# Patient Record
Sex: FEMALE | Race: WHITE | NOT HISPANIC OR LATINO | Employment: UNEMPLOYED | URBAN - METROPOLITAN AREA
[De-identification: names, ages, dates, MRNs, and addresses within clinical notes are randomized per-mention and may not be internally consistent; named-entity substitution may affect disease eponyms.]

---

## 2023-03-06 DIAGNOSIS — F32.A DEPRESSION, UNSPECIFIED DEPRESSION TYPE: ICD-10-CM

## 2023-03-06 DIAGNOSIS — I10 PRIMARY HYPERTENSION: ICD-10-CM

## 2023-03-06 DIAGNOSIS — F51.01 PRIMARY INSOMNIA: ICD-10-CM

## 2023-03-06 DIAGNOSIS — K21.9 GASTROESOPHAGEAL REFLUX DISEASE WITHOUT ESOPHAGITIS: Primary | ICD-10-CM

## 2023-03-06 DIAGNOSIS — R32 INCONTINENCE IN FEMALE: ICD-10-CM

## 2023-03-06 RX ORDER — SERTRALINE HYDROCHLORIDE 100 MG/1
100 TABLET, FILM COATED ORAL 2 TIMES DAILY
Qty: 180 TABLET | Refills: 1 | Status: SHIPPED | OUTPATIENT
Start: 2023-03-06 | End: 2023-08-21 | Stop reason: SDUPTHER

## 2023-03-06 RX ORDER — QUETIAPINE FUMARATE 50 MG/1
50 TABLET, FILM COATED ORAL NIGHTLY
Qty: 90 TABLET | Refills: 1 | Status: SHIPPED | OUTPATIENT
Start: 2023-03-06 | End: 2023-08-21 | Stop reason: SDUPTHER

## 2023-03-06 RX ORDER — BUPROPION HYDROCHLORIDE 150 MG/1
150 TABLET, EXTENDED RELEASE ORAL 2 TIMES DAILY
Qty: 180 TABLET | Refills: 1 | Status: SHIPPED | OUTPATIENT
Start: 2023-03-06 | End: 2023-08-21 | Stop reason: SDUPTHER

## 2023-03-06 RX ORDER — OXYBUTYNIN CHLORIDE 15 MG/1
15 TABLET, EXTENDED RELEASE ORAL DAILY
COMMUNITY
Start: 2022-09-01 | End: 2023-03-06 | Stop reason: SDUPTHER

## 2023-03-06 RX ORDER — FOLIC ACID 1 MG/1
1 TABLET ORAL DAILY
COMMUNITY
Start: 2022-01-17

## 2023-03-06 RX ORDER — AMLODIPINE AND BENAZEPRIL HYDROCHLORIDE 5; 10 MG/1; MG/1
1 CAPSULE ORAL DAILY
Qty: 90 CAPSULE | Refills: 1 | Status: SHIPPED | OUTPATIENT
Start: 2023-03-06 | End: 2023-08-21 | Stop reason: SDUPTHER

## 2023-03-06 RX ORDER — TIZANIDINE 4 MG/1
4 TABLET ORAL 2 TIMES DAILY PRN
COMMUNITY
Start: 2022-04-27

## 2023-03-06 RX ORDER — LANSOPRAZOLE 30 MG/1
30 CAPSULE, DELAYED RELEASE ORAL DAILY
COMMUNITY
Start: 2020-08-27 | End: 2023-03-06 | Stop reason: SDUPTHER

## 2023-03-06 RX ORDER — AMITRIPTYLINE HYDROCHLORIDE 50 MG/1
50 TABLET, FILM COATED ORAL NIGHTLY
COMMUNITY
Start: 2021-03-17 | End: 2023-03-06 | Stop reason: SDUPTHER

## 2023-03-06 RX ORDER — LANSOPRAZOLE 30 MG/1
30 CAPSULE, DELAYED RELEASE ORAL DAILY
Qty: 90 CAPSULE | Refills: 1 | Status: SHIPPED | OUTPATIENT
Start: 2023-03-06 | End: 2023-08-21 | Stop reason: SDUPTHER

## 2023-03-06 RX ORDER — OXYBUTYNIN CHLORIDE 15 MG/1
15 TABLET, EXTENDED RELEASE ORAL DAILY
Qty: 90 TABLET | Refills: 1 | Status: SHIPPED | OUTPATIENT
Start: 2023-03-06 | End: 2023-08-21 | Stop reason: SDUPTHER

## 2023-03-06 RX ORDER — AMLODIPINE AND BENAZEPRIL HYDROCHLORIDE 5; 10 MG/1; MG/1
1 CAPSULE ORAL DAILY
COMMUNITY
Start: 2022-04-27 | End: 2023-03-06 | Stop reason: SDUPTHER

## 2023-03-06 RX ORDER — SERTRALINE HYDROCHLORIDE 100 MG/1
100 TABLET, FILM COATED ORAL 2 TIMES DAILY
COMMUNITY
Start: 2021-03-29 | End: 2023-03-06 | Stop reason: SDUPTHER

## 2023-03-06 RX ORDER — METHOTREXATE 2.5 MG/1
2.5 TABLET ORAL
COMMUNITY
Start: 2022-01-17

## 2023-03-06 RX ORDER — AMITRIPTYLINE HYDROCHLORIDE 50 MG/1
50 TABLET, FILM COATED ORAL NIGHTLY
Qty: 90 TABLET | Refills: 1 | Status: SHIPPED | OUTPATIENT
Start: 2023-03-06 | End: 2023-08-21 | Stop reason: SDUPTHER

## 2023-03-06 RX ORDER — CARVEDILOL 12.5 MG/1
12.5 TABLET ORAL 2 TIMES DAILY
COMMUNITY
Start: 2022-04-27 | End: 2023-03-06 | Stop reason: SDUPTHER

## 2023-03-06 RX ORDER — GABAPENTIN 300 MG/1
300 CAPSULE ORAL 3 TIMES DAILY PRN
COMMUNITY
Start: 2021-04-28 | End: 2023-08-21 | Stop reason: SDUPTHER

## 2023-03-06 RX ORDER — QUETIAPINE FUMARATE 50 MG/1
50 TABLET, FILM COATED ORAL NIGHTLY
COMMUNITY
Start: 2022-04-27 | End: 2023-03-06 | Stop reason: SDUPTHER

## 2023-03-06 RX ORDER — BUPROPION HYDROCHLORIDE 150 MG/1
150 TABLET, EXTENDED RELEASE ORAL 2 TIMES DAILY
COMMUNITY
Start: 2021-04-02 | End: 2023-03-06 | Stop reason: SDUPTHER

## 2023-03-06 RX ORDER — CARVEDILOL 12.5 MG/1
12.5 TABLET ORAL 2 TIMES DAILY
Qty: 90 TABLET | Refills: 1 | Status: SHIPPED | OUTPATIENT
Start: 2023-03-06 | End: 2023-04-12 | Stop reason: SDUPTHER

## 2023-04-12 DIAGNOSIS — I10 PRIMARY HYPERTENSION: ICD-10-CM

## 2023-04-12 RX ORDER — CARVEDILOL 12.5 MG/1
12.5 TABLET ORAL 2 TIMES DAILY
Qty: 180 TABLET | Refills: 1 | Status: SHIPPED | OUTPATIENT
Start: 2023-04-12 | End: 2023-08-21 | Stop reason: SDUPTHER

## 2023-07-18 ENCOUNTER — APPOINTMENT (OUTPATIENT)
Dept: PRIMARY CARE | Facility: CLINIC | Age: 73
End: 2023-07-18
Payer: MEDICARE

## 2023-07-18 PROBLEM — I25.10 CAD (CORONARY ARTERY DISEASE): Status: ACTIVE | Noted: 2023-07-18

## 2023-07-18 PROBLEM — R32 INCONTINENCE OF URINE: Status: ACTIVE | Noted: 2023-07-18

## 2023-07-18 PROBLEM — S88.119A AMPUTATION BELOW KNEE (MULTI): Status: ACTIVE | Noted: 2023-07-18

## 2023-07-18 PROBLEM — E05.90 HYPERTHYROIDISM: Status: ACTIVE | Noted: 2023-07-18

## 2023-07-18 PROBLEM — S78.119A AMPUTATION ABOVE KNEE (MULTI): Status: ACTIVE | Noted: 2023-07-18

## 2023-07-18 PROBLEM — I50.22 CHRONIC SYSTOLIC CONGESTIVE HEART FAILURE (MULTI): Status: RESOLVED | Noted: 2023-07-18 | Resolved: 2023-07-18

## 2023-07-18 PROBLEM — F31.9 BIPOLAR DEPRESSION (MULTI): Status: ACTIVE | Noted: 2023-07-18

## 2023-07-18 PROBLEM — G89.29 CHRONIC PAIN: Status: ACTIVE | Noted: 2023-07-18

## 2023-07-18 PROBLEM — K21.9 GERD (GASTROESOPHAGEAL REFLUX DISEASE): Status: ACTIVE | Noted: 2023-07-18

## 2023-07-18 PROBLEM — R52 PHANTOM PAIN: Status: ACTIVE | Noted: 2023-07-18

## 2023-07-18 PROBLEM — G54.8 PHANTOM PAIN: Status: ACTIVE | Noted: 2023-07-18

## 2023-07-18 PROBLEM — I10 BENIGN ESSENTIAL HYPERTENSION: Status: ACTIVE | Noted: 2023-07-18

## 2023-08-11 ENCOUNTER — APPOINTMENT (OUTPATIENT)
Dept: PRIMARY CARE | Facility: CLINIC | Age: 73
End: 2023-08-11
Payer: MEDICARE

## 2023-08-21 ENCOUNTER — OFFICE VISIT (OUTPATIENT)
Dept: PRIMARY CARE | Facility: CLINIC | Age: 73
End: 2023-08-21
Payer: MEDICARE

## 2023-08-21 VITALS
OXYGEN SATURATION: 96 % | RESPIRATION RATE: 16 BRPM | HEART RATE: 86 BPM | DIASTOLIC BLOOD PRESSURE: 60 MMHG | TEMPERATURE: 97 F | SYSTOLIC BLOOD PRESSURE: 112 MMHG

## 2023-08-21 DIAGNOSIS — R52 PHANTOM PAIN: ICD-10-CM

## 2023-08-21 DIAGNOSIS — K21.9 GASTROESOPHAGEAL REFLUX DISEASE WITHOUT ESOPHAGITIS: ICD-10-CM

## 2023-08-21 DIAGNOSIS — S78.119A AMPUTATION ABOVE KNEE (MULTI): ICD-10-CM

## 2023-08-21 DIAGNOSIS — S88.119A AMPUTATION BELOW KNEE (MULTI): ICD-10-CM

## 2023-08-21 DIAGNOSIS — F51.01 PRIMARY INSOMNIA: ICD-10-CM

## 2023-08-21 DIAGNOSIS — K51.90 ULCERATIVE COLITIS WITHOUT COMPLICATIONS, UNSPECIFIED LOCATION (MULTI): ICD-10-CM

## 2023-08-21 DIAGNOSIS — I10 BENIGN ESSENTIAL HYPERTENSION: Primary | ICD-10-CM

## 2023-08-21 DIAGNOSIS — M05.50 RHEUMATOID POLYNEUROPATHY WITH RHEUMATOID ARTHRITIS OF UNSPECIFIED SITE (MULTI): ICD-10-CM

## 2023-08-21 DIAGNOSIS — N39.46 MIXED STRESS AND URGE URINARY INCONTINENCE: ICD-10-CM

## 2023-08-21 DIAGNOSIS — F31.9 BIPOLAR DEPRESSION (MULTI): ICD-10-CM

## 2023-08-21 DIAGNOSIS — I73.9 PERIPHERAL VASCULAR DISEASE, UNSPECIFIED (CMS-HCC): ICD-10-CM

## 2023-08-21 DIAGNOSIS — G43.909 MIGRAINE SYNDROME: ICD-10-CM

## 2023-08-21 DIAGNOSIS — G54.8 PHANTOM PAIN: ICD-10-CM

## 2023-08-21 PROCEDURE — 1159F MED LIST DOCD IN RCRD: CPT | Performed by: FAMILY MEDICINE

## 2023-08-21 PROCEDURE — 90677 PCV20 VACCINE IM: CPT | Performed by: FAMILY MEDICINE

## 2023-08-21 PROCEDURE — G0009 ADMIN PNEUMOCOCCAL VACCINE: HCPCS | Performed by: FAMILY MEDICINE

## 2023-08-21 PROCEDURE — 99215 OFFICE O/P EST HI 40 MIN: CPT | Performed by: FAMILY MEDICINE

## 2023-08-21 PROCEDURE — 3074F SYST BP LT 130 MM HG: CPT | Performed by: FAMILY MEDICINE

## 2023-08-21 PROCEDURE — 3078F DIAST BP <80 MM HG: CPT | Performed by: FAMILY MEDICINE

## 2023-08-21 PROCEDURE — 1160F RVW MEDS BY RX/DR IN RCRD: CPT | Performed by: FAMILY MEDICINE

## 2023-08-21 RX ORDER — AMITRIPTYLINE HYDROCHLORIDE 50 MG/1
50 TABLET, FILM COATED ORAL NIGHTLY
Qty: 90 TABLET | Refills: 1 | Status: SHIPPED | OUTPATIENT
Start: 2023-08-21 | End: 2024-03-07

## 2023-08-21 RX ORDER — QUETIAPINE FUMARATE 50 MG/1
50 TABLET, FILM COATED ORAL NIGHTLY
Qty: 90 TABLET | Refills: 1 | Status: SHIPPED | OUTPATIENT
Start: 2023-08-21 | End: 2024-03-07

## 2023-08-21 RX ORDER — CARVEDILOL 12.5 MG/1
12.5 TABLET ORAL 2 TIMES DAILY
Qty: 180 TABLET | Refills: 1 | Status: SHIPPED | OUTPATIENT
Start: 2023-08-21 | End: 2024-03-07

## 2023-08-21 RX ORDER — LANSOPRAZOLE 30 MG/1
30 CAPSULE, DELAYED RELEASE ORAL DAILY
Qty: 90 CAPSULE | Refills: 1 | Status: SHIPPED | OUTPATIENT
Start: 2023-08-21 | End: 2024-03-07

## 2023-08-21 RX ORDER — GABAPENTIN 300 MG/1
300 CAPSULE ORAL 3 TIMES DAILY
Qty: 270 CAPSULE | Refills: 1 | Status: SHIPPED | OUTPATIENT
Start: 2023-08-21 | End: 2024-03-07

## 2023-08-21 RX ORDER — SERTRALINE HYDROCHLORIDE 100 MG/1
100 TABLET, FILM COATED ORAL 2 TIMES DAILY
Qty: 180 TABLET | Refills: 1 | Status: SHIPPED | OUTPATIENT
Start: 2023-08-21 | End: 2024-03-07

## 2023-08-21 RX ORDER — OXYBUTYNIN CHLORIDE 15 MG/1
15 TABLET, EXTENDED RELEASE ORAL DAILY
Qty: 90 TABLET | Refills: 1 | Status: SHIPPED | OUTPATIENT
Start: 2023-08-21 | End: 2024-03-07

## 2023-08-21 RX ORDER — BUPROPION HYDROCHLORIDE 150 MG/1
150 TABLET, EXTENDED RELEASE ORAL 2 TIMES DAILY
Qty: 180 TABLET | Refills: 1 | Status: SHIPPED | OUTPATIENT
Start: 2023-08-21 | End: 2024-06-04 | Stop reason: SDUPTHER

## 2023-08-21 RX ORDER — AMLODIPINE AND BENAZEPRIL HYDROCHLORIDE 5; 10 MG/1; MG/1
1 CAPSULE ORAL DAILY
Qty: 90 CAPSULE | Refills: 1 | Status: SHIPPED | OUTPATIENT
Start: 2023-08-21 | End: 2024-03-07

## 2023-08-21 NOTE — PROGRESS NOTES
"Sharon Rome is a 72 y.o. female here today for   Chief Complaint   Patient presents with    Depression    Hypertension           She was \"sick\" from February until a few weeks ago - nausea.  Was having heartburn frequently.  But no abdominal pain.  No vomiting.  She says now she is back to normal and eating normally.  No PMH of peptic ulcer disease.  She does take lansoprazole every day.  She denies any change in her bowel movements.  She does not drink ETOH.      HTN recheck -- Patient denies chest pain, SOB, edema, palpitations on review.  Taking medication correctly and denies any side effects.      Not seeing a psychiatrist.  Now she says her previous PCP was Rxing  her psych meds.      Amitriptyline is Rxed for migraines and insomnia.  .      Dr. Watts is rheumatologist.      At age 1 with rheumatic fever and nearly .      Coronary artery disease-she gives a history of 2 heart attacks remotely and a H/O 2 angioplasties.  She completely denies any current chest pain or heart palpitations.  She still smokes cigarettes and says she is not ready to quit yet.    Patient has a very difficult time with transportation since she is a double amputee.  I have referred her to cardiology and pain management at our last visit but she says that she did not make appointments because of transportation problems.  She asks if I can prescribe her medications for her psychiatric history of bipolar depression.  She says that her bipolar depression has been controlled very well with her current medications.  She also takes gabapentin for phantom pain and chronic joint pain.  She says that her pain is fairly well controlled and she would like to continue the same medications.  She also takes lansoprazole daily for reflux and she says this is well controlled.  She has a history of mixed incontinence and takes oxybutynin.  I changed it to extended release oxybutynin at a previous visit and she says this works much better with " much less side effects.      Current Outpatient Medications:     folic acid (Folvite) 1 mg tablet, Take 1 tablet (1 mg) by mouth once daily., Disp: , Rfl:     methotrexate (Trexall) 2.5 mg tablet, Take 1 tablet (2.5 mg total) by mouth.  6 tablets weekly, Disp: , Rfl:     tiZANidine (Zanaflex) 4 mg tablet, Take 1 tablet (4 mg) by mouth 2 times a day as needed for muscle spasms., Disp: , Rfl:     amitriptyline (Elavil) 50 mg tablet, Take 1 tablet (50 mg) by mouth once daily at bedtime., Disp: 90 tablet, Rfl: 1    amLODIPine-benazepriL (Lotrel) 5-10 mg capsule, Take 1 capsule by mouth once daily., Disp: 90 capsule, Rfl: 1    buPROPion SR (Wellbutrin SR) 150 mg 12 hr tablet, Take 1 tablet (150 mg) by mouth 2 times a day., Disp: 180 tablet, Rfl: 1    carvedilol (Coreg) 12.5 mg tablet, Take 1 tablet (12.5 mg) by mouth 2 times a day., Disp: 180 tablet, Rfl: 1    gabapentin (Neurontin) 300 mg capsule, Take 1 capsule (300 mg) by mouth 3 times a day., Disp: 270 capsule, Rfl: 1    lansoprazole (Prevacid) 30 mg DR capsule, Take 1 capsule (30 mg) by mouth once daily., Disp: 90 capsule, Rfl: 1    oxybutynin XL (Ditropan-XL) 15 mg 24 hr tablet, Take 1 tablet (15 mg) by mouth once daily., Disp: 90 tablet, Rfl: 1    QUEtiapine (SEROquel) 50 mg tablet, Take 1 tablet (50 mg) by mouth once daily at bedtime., Disp: 90 tablet, Rfl: 1    sertraline (Zoloft) 100 mg tablet, Take 1 tablet (100 mg) by mouth 2 times a day., Disp: 180 tablet, Rfl: 1    Patient Active Problem List   Diagnosis    Amputation above knee (CMS/HCC)    Amputation below knee (CMS/HCC)    Benign essential hypertension    Bipolar depression (CMS/HCC)    CAD (coronary artery disease)    Chronic pain    GERD (gastroesophageal reflux disease)    Hyperthyroidism    Incontinence of urine    Phantom pain (CMS/HCC)    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     Objective    Visit Vitals  /60   Pulse 86   Temp 36.1 °C (97 °F)   Resp 16   SpO2 96%     There is no height or weight on file to calculate BMI.     Physical Exam   General - Not in acute distress and cooperative.  Build & Nutrition - Well developed  Posture - Normal  Gait - Normal  Mental Status - alert and oriented x 3    Head - Normocephalic    Neck - Thyroid normal size    Eyes - Bilateral - Sclera clear and lids pink without edema or mass.      Skin - Warm and dry with no rashes on visible skin    Lungs - Clear to auscultation and normal breathing effort    Cardiovascular - RRR and no murmurs, rubs or thrill.    Neuropsychiatric - normal mood and affect        Assessment    1. Benign essential hypertension  amLODIPine-benazepriL (Lotrel) 5-10 mg capsule, carvedilol (Coreg) 12.5 mg tablet, Comprehensive Metabolic Panel, CBC, Lipid Panel, Thyroid Stimulating Hormone   Condition well controlled.  No change in current treatment regimen.  Refill given of current medication.  Appropriate labs ordered or reviewed.  Make a follow up appointment with me for recheck in 6 months.       2. Amputation above knee (CMS/HCC)     This has been stable with no recurrence of infection and she says her stump is healthy.   3. Amputation below knee (CMS/HCC)     This has been stable with no recurrence of infection and she says her stump is healthy.   4. Bipolar depression (CMS/HCC)  buPROPion SR (Wellbutrin SR) 150 mg 12 hr tablet, QUEtiapine (SEROquel) 50 mg tablet, sertraline (Zoloft) 100 mg tablet   I will take over prescribing her medications for bipolar depression since she has such difficulty with transportation.  We will continue the same medications and doses.   5. Phantom pain (CMS/HCC)  gabapentin (Neurontin) 300 mg capsule   I will take over prescribing her gabapentin for phantom pain and  other chronic pain secondary to arthritis.   6. Gastroesophageal reflux disease without esophagitis  lansoprazole (Prevacid) 30 mg DR capsule   She did have some recent GI symptoms with nausea as above.  They have completely resolved now and her abdominal exam is normal.  We will continue Prevacid and I told her if her symptoms recur she should get into see me as soon as possible.   7. Primary insomnia     She will continue amitriptyline.  This is also used for chronic migraine headaches.   8. Mixed stress and urge urinary incontinence  oxybutynin XL (Ditropan-XL) 15 mg 24 hr tablet   Condition well controlled.  No change in current treatment regimen.  Refill given of current medication.  Make a follow up appointment with me for recheck in 6 months.   9. 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  She is on gabapentin which does help with this.   10. Ulcerative colitis without complications, unspecified location (CMS/HCC)     She reports this has been stable for years with no blood in her stool or chronic diarrhea.   11. Migraine syndrome  amitriptyline (Elavil) 50 mg tablet   Condition well controlled.  No change in current treatment regimen.  Refill given of current medication.  Make a follow up appointment with me for recheck in 6 months.     12. Peripheral vascular disease, unspecified (CMS/HCC)     Patient with previous history of peripheral vascular disease but now she is a double amputee.  No evidence of arterial compromise in her legs stumps or arms.       She received Prevnar 20 today for routine immunization.

## 2023-08-22 PROBLEM — M05.50 RHEUMATOID POLYNEUROPATHY WITH RHEUMATOID ARTHRITIS OF UNSPECIFIED SITE (MULTI): Status: ACTIVE | Noted: 2023-08-22

## 2023-08-22 PROBLEM — F51.01 PRIMARY INSOMNIA: Status: ACTIVE | Noted: 2023-08-22

## 2023-08-22 PROBLEM — G43.909 MIGRAINE SYNDROME: Status: ACTIVE | Noted: 2023-08-22

## 2023-08-22 PROBLEM — K51.90 ULCERATIVE COLITIS WITHOUT COMPLICATIONS, UNSPECIFIED LOCATION (MULTI): Status: ACTIVE | Noted: 2023-08-22

## 2023-08-23 PROBLEM — I73.9 PERIPHERAL VASCULAR DISEASE, UNSPECIFIED (CMS-HCC): Status: ACTIVE | Noted: 2023-08-23

## 2023-08-30 ENCOUNTER — APPOINTMENT (OUTPATIENT)
Dept: PRIMARY CARE | Facility: CLINIC | Age: 73
End: 2023-08-30
Payer: MEDICARE

## 2023-09-01 DIAGNOSIS — G43.909 MIGRAINE SYNDROME: ICD-10-CM

## 2023-09-01 RX ORDER — AMITRIPTYLINE HYDROCHLORIDE 50 MG/1
50 TABLET, FILM COATED ORAL NIGHTLY
Qty: 90 TABLET | Refills: 0 | OUTPATIENT
Start: 2023-09-01

## 2024-03-07 DIAGNOSIS — K21.9 GASTROESOPHAGEAL REFLUX DISEASE WITHOUT ESOPHAGITIS: ICD-10-CM

## 2024-03-07 DIAGNOSIS — G54.8 PHANTOM PAIN: ICD-10-CM

## 2024-03-07 DIAGNOSIS — R52 PHANTOM PAIN: ICD-10-CM

## 2024-03-07 DIAGNOSIS — N39.46 MIXED STRESS AND URGE URINARY INCONTINENCE: ICD-10-CM

## 2024-03-07 DIAGNOSIS — G43.909 MIGRAINE SYNDROME: ICD-10-CM

## 2024-03-07 DIAGNOSIS — I10 BENIGN ESSENTIAL HYPERTENSION: ICD-10-CM

## 2024-03-07 DIAGNOSIS — F31.9 BIPOLAR DEPRESSION (MULTI): ICD-10-CM

## 2024-03-07 RX ORDER — AMITRIPTYLINE HYDROCHLORIDE 50 MG/1
50 TABLET, FILM COATED ORAL NIGHTLY
Qty: 90 TABLET | Refills: 0 | Status: SHIPPED | OUTPATIENT
Start: 2024-03-07 | End: 2024-06-04 | Stop reason: SDUPTHER

## 2024-03-07 RX ORDER — SERTRALINE HYDROCHLORIDE 100 MG/1
100 TABLET, FILM COATED ORAL 2 TIMES DAILY
Qty: 180 TABLET | Refills: 0 | Status: SHIPPED | OUTPATIENT
Start: 2024-03-07 | End: 2024-06-04 | Stop reason: SDUPTHER

## 2024-03-07 RX ORDER — LANSOPRAZOLE 30 MG/1
30 CAPSULE, DELAYED RELEASE ORAL DAILY
Qty: 90 CAPSULE | Refills: 0 | Status: SHIPPED | OUTPATIENT
Start: 2024-03-07 | End: 2024-06-04 | Stop reason: SDUPTHER

## 2024-03-07 RX ORDER — QUETIAPINE FUMARATE 50 MG/1
50 TABLET, FILM COATED ORAL NIGHTLY
Qty: 90 TABLET | Refills: 0 | Status: SHIPPED | OUTPATIENT
Start: 2024-03-07 | End: 2024-06-04 | Stop reason: SDUPTHER

## 2024-03-07 RX ORDER — GABAPENTIN 300 MG/1
300 CAPSULE ORAL 3 TIMES DAILY
Qty: 270 CAPSULE | Refills: 0 | Status: SHIPPED | OUTPATIENT
Start: 2024-03-07 | End: 2024-06-04 | Stop reason: SDUPTHER

## 2024-03-07 RX ORDER — CARVEDILOL 12.5 MG/1
12.5 TABLET ORAL 2 TIMES DAILY
Qty: 180 TABLET | Refills: 0 | Status: SHIPPED | OUTPATIENT
Start: 2024-03-07 | End: 2024-06-04 | Stop reason: SDUPTHER

## 2024-03-07 RX ORDER — AMLODIPINE AND BENAZEPRIL HYDROCHLORIDE 5; 10 MG/1; MG/1
1 CAPSULE ORAL DAILY
Qty: 90 CAPSULE | Refills: 0 | Status: SHIPPED | OUTPATIENT
Start: 2024-03-07 | End: 2024-06-04 | Stop reason: SDUPTHER

## 2024-03-07 RX ORDER — OXYBUTYNIN CHLORIDE 15 MG/1
15 TABLET, EXTENDED RELEASE ORAL DAILY
Qty: 90 TABLET | Refills: 0 | Status: SHIPPED | OUTPATIENT
Start: 2024-03-07 | End: 2024-06-04 | Stop reason: SDUPTHER

## 2024-06-04 ENCOUNTER — OFFICE VISIT (OUTPATIENT)
Dept: PRIMARY CARE | Facility: CLINIC | Age: 74
End: 2024-06-04
Payer: MEDICARE

## 2024-06-04 VITALS
OXYGEN SATURATION: 96 % | HEART RATE: 66 BPM | RESPIRATION RATE: 16 BRPM | SYSTOLIC BLOOD PRESSURE: 110 MMHG | DIASTOLIC BLOOD PRESSURE: 64 MMHG | TEMPERATURE: 98.2 F

## 2024-06-04 DIAGNOSIS — N39.46 MIXED STRESS AND URGE URINARY INCONTINENCE: ICD-10-CM

## 2024-06-04 DIAGNOSIS — R52 PHANTOM PAIN: ICD-10-CM

## 2024-06-04 DIAGNOSIS — R09.89 PHLEGM IN THROAT: Primary | ICD-10-CM

## 2024-06-04 DIAGNOSIS — R05.3 CHRONIC COUGH: ICD-10-CM

## 2024-06-04 DIAGNOSIS — I10 BENIGN ESSENTIAL HYPERTENSION: ICD-10-CM

## 2024-06-04 DIAGNOSIS — K21.9 GASTROESOPHAGEAL REFLUX DISEASE WITHOUT ESOPHAGITIS: ICD-10-CM

## 2024-06-04 DIAGNOSIS — G43.909 MIGRAINE SYNDROME: ICD-10-CM

## 2024-06-04 DIAGNOSIS — F31.9 BIPOLAR DEPRESSION (MULTI): ICD-10-CM

## 2024-06-04 DIAGNOSIS — G54.8 PHANTOM PAIN: ICD-10-CM

## 2024-06-04 PROCEDURE — 3078F DIAST BP <80 MM HG: CPT | Performed by: FAMILY MEDICINE

## 2024-06-04 PROCEDURE — 1160F RVW MEDS BY RX/DR IN RCRD: CPT | Performed by: FAMILY MEDICINE

## 2024-06-04 PROCEDURE — 99215 OFFICE O/P EST HI 40 MIN: CPT | Performed by: FAMILY MEDICINE

## 2024-06-04 PROCEDURE — 1159F MED LIST DOCD IN RCRD: CPT | Performed by: FAMILY MEDICINE

## 2024-06-04 PROCEDURE — G2211 COMPLEX E/M VISIT ADD ON: HCPCS | Performed by: FAMILY MEDICINE

## 2024-06-04 PROCEDURE — 3074F SYST BP LT 130 MM HG: CPT | Performed by: FAMILY MEDICINE

## 2024-06-04 RX ORDER — LANSOPRAZOLE 30 MG/1
30 CAPSULE, DELAYED RELEASE ORAL DAILY
Qty: 90 CAPSULE | Refills: 1 | Status: SHIPPED | OUTPATIENT
Start: 2024-06-04

## 2024-06-04 RX ORDER — SERTRALINE HYDROCHLORIDE 100 MG/1
200 TABLET, FILM COATED ORAL DAILY
Qty: 180 TABLET | Refills: 1 | Status: SHIPPED | OUTPATIENT
Start: 2024-06-04

## 2024-06-04 RX ORDER — AMLODIPINE AND BENAZEPRIL HYDROCHLORIDE 5; 10 MG/1; MG/1
1 CAPSULE ORAL DAILY
Qty: 90 CAPSULE | Refills: 1 | Status: SHIPPED | OUTPATIENT
Start: 2024-06-04

## 2024-06-04 RX ORDER — BUPROPION HYDROCHLORIDE 150 MG/1
150 TABLET, EXTENDED RELEASE ORAL 2 TIMES DAILY
Qty: 180 TABLET | Refills: 1 | Status: SHIPPED | OUTPATIENT
Start: 2024-06-04

## 2024-06-04 RX ORDER — AMITRIPTYLINE HYDROCHLORIDE 50 MG/1
50 TABLET, FILM COATED ORAL NIGHTLY
Qty: 90 TABLET | Refills: 1 | Status: SHIPPED | OUTPATIENT
Start: 2024-06-04

## 2024-06-04 RX ORDER — QUETIAPINE FUMARATE 50 MG/1
50 TABLET, FILM COATED ORAL NIGHTLY
Qty: 90 TABLET | Refills: 1 | Status: SHIPPED | OUTPATIENT
Start: 2024-06-04

## 2024-06-04 RX ORDER — OXYBUTYNIN CHLORIDE 15 MG/1
15 TABLET, EXTENDED RELEASE ORAL DAILY
Qty: 90 TABLET | Refills: 1 | Status: SHIPPED | OUTPATIENT
Start: 2024-06-04

## 2024-06-04 RX ORDER — GABAPENTIN 300 MG/1
300 CAPSULE ORAL 3 TIMES DAILY
Qty: 270 CAPSULE | Refills: 1 | Status: SHIPPED | OUTPATIENT
Start: 2024-06-04

## 2024-06-04 RX ORDER — CARVEDILOL 12.5 MG/1
12.5 TABLET ORAL 2 TIMES DAILY
Qty: 180 TABLET | Refills: 1 | Status: SHIPPED | OUTPATIENT
Start: 2024-06-04

## 2024-06-04 NOTE — PROGRESS NOTES
Sharon Rome is a 73 y.o. female here today for   Chief Complaint   Patient presents with    Hypertension    GERD    phanton pain    Bipolar    Migraine    Urinary Incontinence    Nasal Congestion     Phlegm from nose and vomiting         HPI     Patient with phlegm in lungs for 3 months.  She has to cough it up.  Present all the time.  More SOB.  She smokes.  Also in her nose too.  It sometimes causes her to vomit when in her throat.  She has no previous diagnosis of COPD.  She denies any history of seasonal allergies.  She is not having any sneezing or watery eyes.  She is not having any hemoptysis or chest pain.    HTN recheck -- Patient denies chest pain, SOB, edema, palpitations on review.  Taking medication correctly and denies any side effects.    GERD recheck -- Patient reports GI symptoms are well controlled with current treatment.  No heartburn, chest pain, vomiting, diarrhea.  No SE's from current treatment.  Patient wishes to continue the same treatment.      Recheck insomnia -- She is sleeping well with current medications.    Recheck incontinence -- seems pretty well controlled with medication.      Migraine HA's - stable with rare HA's.  No problems with medications.  No new headaches and no focal neurological deficits.    Mood disorder recheck -- Patient feels like condition is well controlled.  Has good control of mood and emotional reactions.  No SE's or problems with medications.  No homicidal or suicidal ideation.  Patient wishes to continue same medications.          Current Outpatient Medications:     folic acid (Folvite) 1 mg tablet, Take 1 tablet (1 mg) by mouth once daily., Disp: , Rfl:     methotrexate (Trexall) 2.5 mg tablet, Take 1 tablet (2.5 mg total) by mouth.  6 tablets weekly, Disp: , Rfl:     tiZANidine (Zanaflex) 4 mg tablet, Take 1 tablet (4 mg) by mouth 2 times a day as needed for muscle spasms., Disp: , Rfl:     amitriptyline (Elavil) 50 mg tablet, Take 1 tablet (50 mg) by  mouth once daily at bedtime., Disp: 90 tablet, Rfl: 1    amLODIPine-benazepriL (Lotrel) 5-10 mg capsule, Take 1 capsule by mouth once daily., Disp: 90 capsule, Rfl: 1    buPROPion SR (Wellbutrin SR) 150 mg 12 hr tablet, Take 1 tablet (150 mg) by mouth 2 times a day., Disp: 180 tablet, Rfl: 1    carvedilol (Coreg) 12.5 mg tablet, Take 1 tablet (12.5 mg) by mouth 2 times a day., Disp: 180 tablet, Rfl: 1    gabapentin (Neurontin) 300 mg capsule, Take 1 capsule (300 mg) by mouth 3 times a day., Disp: 270 capsule, Rfl: 1    ipratropium-albuteroL (Combivent Respimat)  mcg/actuation inhaler, Inhale 1 puff 4 times a day., Disp: 12 g, Rfl: 1    lansoprazole (Prevacid) 30 mg DR capsule, Take 1 capsule (30 mg) by mouth once daily., Disp: 90 capsule, Rfl: 1    oxybutynin XL (Ditropan-XL) 15 mg 24 hr tablet, Take 1 tablet (15 mg) by mouth once daily., Disp: 90 tablet, Rfl: 1    QUEtiapine (SEROquel) 50 mg tablet, Take 1 tablet (50 mg) by mouth once daily at bedtime., Disp: 90 tablet, Rfl: 1    sertraline (Zoloft) 100 mg tablet, Take 2 tablets (200 mg) by mouth once daily., Disp: 180 tablet, Rfl: 1    Patient Active Problem List   Diagnosis    Amputation above knee (Multi)    Amputation below knee (Multi)    Primary hypertension    Bipolar depression (Multi)    CAD (coronary artery disease)    Chronic pain    GERD without esophagitis    Hyperthyroidism    Incontinence of urine    Phantom pain    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 (Multi)    Ulcerative colitis without complications, unspecified location (Multi)    Primary insomnia    Migraine syndrome    Peripheral vascular disease, unspecified (CMS-[facility])    Varicose veins of lower extremities with ulcer (Multi)    Urge incontinence    Unspecified cataract    Tobacco use disorder    Scabies    Rheumatoid arthritis (Multi)    Rheumatic fever    Pyoderma gangrenosum (Multi)    Phantom limb syndrome (Multi)    Osteoporosis    Osteoarthritis     Major depressive disorder, recurrent episode (CMS-HCC)    Intestinal infection due to Clostridium difficile    Female stress incontinence    Congestive heart failure (Multi)    Closed Colles' fracture    Candidiasis of vulva and vagina    Aortic valve disorder    Adhesive capsulitis of shoulder    Abnormality of gait         No results found for this or any previous visit (from the past 672 hour(s)).     Objective    Visit Vitals    Visit Vitals  /64   Pulse 66   Temp 36.8 °C (98.2 °F)   Resp 16   SpO2 96%   Smoking Status Every Day       There is no height or weight on file to calculate BMI.     Physical Exam   General - Not in acute distress and cooperative.  Build & Nutrition - Well developed  Posture - Normal  Gait - Normal  Mental Status - alert and oriented x 3    Head - Normocephalic    Neck - Thyroid normal size    Eyes - Bilateral - Sclera clear and lids pink without edema or mass.      Skin - Warm and dry with no rashes on visible skin    Lungs - Clear to auscultation and normal breathing effort    Cardiovascular - RRR and no murmurs, rubs or thrill.    Peripheral Vascular - Bilateral - no edema present    Neuropsychiatric - normal mood and affect        Assessment    1. Phlegm in throat  XR chest 2 views      2. Chronic cough  XR chest 2 views, ipratropium-albuteroL (Combivent Respimat)  mcg/actuation inhaler   I think she likely has a component of COPD since she has a multiple year history of smoking and continues to smoke.  We will get a chest x-ray.  She declined pulmonary function testing or further workup.  We will try Combivent inhaler to help with her symptoms.  I strongly recommend that she quit smoking.     3. Benign essential hypertension  amLODIPine-benazepriL (Lotrel) 5-10 mg capsule, carvedilol (Coreg) 12.5 mg tablet, Comprehensive Metabolic Panel, CBC, Lipid Panel   Condition well controlled.  No change in current treatment regimen.  Refill given of current medication.   Appropriate labs ordered or reviewed.  Make a follow up appointment with me for recheck in 6 months.       4. Bipolar depression (Multi)  buPROPion SR (Wellbutrin SR) 150 mg 12 hr tablet, QUEtiapine (SEROquel) 50 mg tablet, sertraline (Zoloft) 100 mg tablet   Condition well controlled.  No change in current treatment regimen.  Refill given of current medication.  Make a follow up appointment with me for recheck in 6 months.       5. Gastroesophageal reflux disease without esophagitis  lansoprazole (Prevacid) 30 mg DR capsule   Condition well controlled.  No change in current treatment regimen.  Refill given of current medication.  Make a follow up appointment with me for recheck in 6 months.       6. Migraine syndrome  amitriptyline (Elavil) 50 mg tablet   Condition well controlled.  No change in current treatment regimen.  Refill given of current medication.  Make a follow up appointment with me for recheck in 6 months.       7. Mixed stress and urge urinary incontinence  oxybutynin XL (Ditropan-XL) 15 mg 24 hr tablet   Condition well controlled.  No change in current treatment regimen.  Refill given of current medication.  Make a follow up appointment with me for recheck in 6 months.       8. Phantom pain  gabapentin (Neurontin) 300 mg capsule   Condition well controlled.  No change in current treatment regimen.  Refill given of current medication.  Make a follow up appointment with me for recheck in 6 months.             Orders Placed This Encounter      amitriptyline (Elavil) 50 mg tablet      amLODIPine-benazepriL (Lotrel) 5-10 mg capsule      buPROPion SR (Wellbutrin SR) 150 mg 12 hr tablet      carvedilol (Coreg) 12.5 mg tablet      gabapentin (Neurontin) 300 mg capsule      ipratropium-albuteroL (Combivent Respimat)  mcg/actuation inhaler      lansoprazole (Prevacid) 30 mg DR capsule      oxybutynin XL (Ditropan-XL) 15 mg 24 hr tablet      QUEtiapine (SEROquel) 50 mg tablet      sertraline (Zoloft) 100  mg tablet       Orders Placed This Encounter   Procedures    XR chest 2 views    Comprehensive Metabolic Panel    CBC    Lipid Panel        New Medications Ordered This Visit   Medications    ipratropium-albuteroL (Combivent Respimat)  mcg/actuation inhaler     Sig: Inhale 1 puff 4 times a day.     Dispense:  12 g     Refill:  1    amLODIPine-benazepriL (Lotrel) 5-10 mg capsule     Sig: Take 1 capsule by mouth once daily.     Dispense:  90 capsule     Refill:  1    carvedilol (Coreg) 12.5 mg tablet     Sig: Take 1 tablet (12.5 mg) by mouth 2 times a day.     Dispense:  180 tablet     Refill:  1    buPROPion SR (Wellbutrin SR) 150 mg 12 hr tablet     Sig: Take 1 tablet (150 mg) by mouth 2 times a day.     Dispense:  180 tablet     Refill:  1    QUEtiapine (SEROquel) 50 mg tablet     Sig: Take 1 tablet (50 mg) by mouth once daily at bedtime.     Dispense:  90 tablet     Refill:  1    sertraline (Zoloft) 100 mg tablet     Sig: Take 2 tablets (200 mg) by mouth once daily.     Dispense:  180 tablet     Refill:  1    lansoprazole (Prevacid) 30 mg DR capsule     Sig: Take 1 capsule (30 mg) by mouth once daily.     Dispense:  90 capsule     Refill:  1    amitriptyline (Elavil) 50 mg tablet     Sig: Take 1 tablet (50 mg) by mouth once daily at bedtime.     Dispense:  90 tablet     Refill:  1    oxybutynin XL (Ditropan-XL) 15 mg 24 hr tablet     Sig: Take 1 tablet (15 mg) by mouth once daily.     Dispense:  90 tablet     Refill:  1    gabapentin (Neurontin) 300 mg capsule     Sig: Take 1 capsule (300 mg) by mouth 3 times a day.     Dispense:  270 capsule     Refill:  1

## 2024-08-16 ENCOUNTER — TELEPHONE (OUTPATIENT)
Dept: PRIMARY CARE | Facility: CLINIC | Age: 74
End: 2024-08-16
Payer: MEDICARE

## 2024-09-06 ENCOUNTER — APPOINTMENT (OUTPATIENT)
Dept: PRIMARY CARE | Facility: CLINIC | Age: 74
End: 2024-09-06
Payer: MEDICARE

## 2024-09-06 VITALS
RESPIRATION RATE: 22 BRPM | TEMPERATURE: 97 F | DIASTOLIC BLOOD PRESSURE: 56 MMHG | OXYGEN SATURATION: 96 % | HEART RATE: 62 BPM | SYSTOLIC BLOOD PRESSURE: 98 MMHG

## 2024-09-06 DIAGNOSIS — G54.6 PHANTOM LIMB SYNDROME WITH PAIN (MULTI): ICD-10-CM

## 2024-09-06 DIAGNOSIS — I83.009 VARICOSE VEINS OF LOWER EXTREMITY WITH ULCER, UNSPECIFIED LATERALITY, UNSPECIFIED ULCER STAGE, UNSPECIFIED ULCERATION SITE (MULTI): ICD-10-CM

## 2024-09-06 DIAGNOSIS — M05.50 RHEUMATOID POLYNEUROPATHY WITH RHEUMATOID ARTHRITIS OF UNSPECIFIED SITE (MULTI): ICD-10-CM

## 2024-09-06 DIAGNOSIS — I50.9 CONGESTIVE HEART FAILURE, UNSPECIFIED HF CHRONICITY, UNSPECIFIED HEART FAILURE TYPE (MULTI): ICD-10-CM

## 2024-09-06 DIAGNOSIS — L97.909 VARICOSE VEINS OF LOWER EXTREMITY WITH ULCER, UNSPECIFIED LATERALITY, UNSPECIFIED ULCER STAGE, UNSPECIFIED ULCERATION SITE (MULTI): ICD-10-CM

## 2024-09-06 DIAGNOSIS — Z76.89 ENCOUNTER FOR POWER MOBILITY DEVICE ASSESSMENT: Primary | ICD-10-CM

## 2024-09-06 DIAGNOSIS — K51.90 ULCERATIVE COLITIS WITHOUT COMPLICATIONS, UNSPECIFIED LOCATION (MULTI): ICD-10-CM

## 2024-09-06 PROCEDURE — 99215 OFFICE O/P EST HI 40 MIN: CPT | Performed by: FAMILY MEDICINE

## 2024-09-06 PROCEDURE — G2211 COMPLEX E/M VISIT ADD ON: HCPCS | Performed by: FAMILY MEDICINE

## 2024-09-06 PROCEDURE — 1158F ADVNC CARE PLAN TLK DOCD: CPT | Performed by: FAMILY MEDICINE

## 2024-09-06 PROCEDURE — 3074F SYST BP LT 130 MM HG: CPT | Performed by: FAMILY MEDICINE

## 2024-09-06 PROCEDURE — 1123F ACP DISCUSS/DSCN MKR DOCD: CPT | Performed by: FAMILY MEDICINE

## 2024-09-06 PROCEDURE — 3078F DIAST BP <80 MM HG: CPT | Performed by: FAMILY MEDICINE

## 2024-09-06 PROCEDURE — 1159F MED LIST DOCD IN RCRD: CPT | Performed by: FAMILY MEDICINE

## 2024-09-06 NOTE — PROGRESS NOTES
Sharon Rome is a 73 y.o. female here today for   Chief Complaint   Patient presents with    Immobility     Needs mobility exam for new wheelchair         HPI   Chief complaint: Evaluation for power mobility device    This is a 73-year-old white female with a medical history of bilateral leg amputations.  She had a right leg above-the-knee amputation on 9/29/2007 and a left leg below the knee amputation on 6/16/2004.  These were both secondary to infected pyoderma gangrenosum and osteomyelitis.  She does not have prosthetic legs.  She also has a history of rheumatoid arthritis of the hands which leads to weakness and pain in her hands.  She has bilateral arm weakness from generalized debility and disuse.      She lives alone in a wheelchair accessible apartment.  She is functioning independently.  She self transfers from her wheelchair to her bed and for toileting and for showers.  She currently has a power mobility device which is over 5 years old.  She needs a new mobility device because the old chair has a large rip in the seat and is nearing the end of its life.  Her multiple medical conditions impair her ability to complete activities of daily living safely at home without a power mobility device.    The ability to perform efficient safe and structured ambulation with a cane or walker is not possible as a result of the aforementioned conditions.  The patient's upper extremity function is insufficient to self propel a manual wheelchair in the home setting in order to complete mobility related activities related to daily living in the home.    A scooter cannot provide independent functional mobility in the home setting because it cannot provide safe seating options to address the current and progressive medical needs of this patient including skin breakdown as well as pelvic obliquity and tilt.  Also due to impaired motor strength and dexterity in the hands and arms the patient is unable to reach and manipulate  the tiller.  She is unable to functionally operate a scooter due to concerns with pressure relief and decreased truncal control and function.    A power mobility device is medically necessary to continue the patient's ability to safely complete ADLs and other activities in their home setting.  The patient's living environment is accessible for the use of a wheelchair.  The required power mobility device will enable the patient to complete mobility related activities of daily living including transfers, household and community mobility which includes safely attending medical appointments.  Without the prescribed mobility device the patient is unable to leave and access other rooms throughout the home including possibly remaining in bed only.  If the patient is unable to get out of bed they are likely to have increased risk to develop atelectasis, pneumonia, pressure sores and muscle atrophy along with other medical or psychological problems.    I have completed the decision component of the face-to-face evaluation.    Other past medical history: Primary hypertension, aortic valve disorder, bipolar depression, coronary artery disease, history of congestive heart failure, female stress incontinence, GERD without esophagitis, osteoporosis, rheumatoid arthritis, ulcerative colitis, primary insomnia.      Current Outpatient Medications:     amitriptyline (Elavil) 50 mg tablet, Take 1 tablet (50 mg) by mouth once daily at bedtime., Disp: 90 tablet, Rfl: 1    amLODIPine-benazepriL (Lotrel) 5-10 mg capsule, Take 1 capsule by mouth once daily., Disp: 90 capsule, Rfl: 1    buPROPion SR (Wellbutrin SR) 150 mg 12 hr tablet, Take 1 tablet (150 mg) by mouth 2 times a day., Disp: 180 tablet, Rfl: 1    carvedilol (Coreg) 12.5 mg tablet, Take 1 tablet (12.5 mg) by mouth 2 times a day., Disp: 180 tablet, Rfl: 1    folic acid (Folvite) 1 mg tablet, Take 1 tablet (1 mg) by mouth once daily., Disp: , Rfl:     gabapentin (Neurontin) 300  mg capsule, Take 1 capsule (300 mg) by mouth 3 times a day., Disp: 270 capsule, Rfl: 1    ipratropium-albuteroL (Combivent Respimat)  mcg/actuation inhaler, Inhale 1 puff 4 times a day., Disp: 12 g, Rfl: 1    lansoprazole (Prevacid) 30 mg DR capsule, Take 1 capsule (30 mg) by mouth once daily., Disp: 90 capsule, Rfl: 1    methotrexate (Trexall) 2.5 mg tablet, Take 1 tablet (2.5 mg total) by mouth.  6 tablets weekly, Disp: , Rfl:     oxybutynin XL (Ditropan-XL) 15 mg 24 hr tablet, Take 1 tablet (15 mg) by mouth once daily., Disp: 90 tablet, Rfl: 1    QUEtiapine (SEROquel) 50 mg tablet, Take 1 tablet (50 mg) by mouth once daily at bedtime., Disp: 90 tablet, Rfl: 1    sertraline (Zoloft) 100 mg tablet, Take 2 tablets (200 mg) by mouth once daily., Disp: 180 tablet, Rfl: 1    tiZANidine (Zanaflex) 4 mg tablet, Take 1 tablet (4 mg) by mouth 2 times a day as needed for muscle spasms., Disp: , Rfl:     Patient Active Problem List   Diagnosis    Amputation above knee (Multi)    Amputation below knee (Multi)    Primary hypertension    Bipolar depression (Multi)    CAD (coronary artery disease)    Chronic pain    GERD without esophagitis    Hyperthyroidism    Incontinence of urine    Phantom pain    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 (Multi)    Ulcerative colitis without complications, unspecified location (Multi)    Primary insomnia    Migraine syndrome    Peripheral vascular disease, unspecified (CMS-HCC)    Varicose veins of lower extremities with ulcer (Multi)    Urge incontinence    Unspecified cataract    Tobacco use disorder    Scabies    Rheumatoid arthritis (Multi)    Rheumatic fever    Pyoderma gangrenosum (Multi)    Phantom limb syndrome (Multi)    Osteoporosis    Osteoarthritis    Major depressive disorder, recurrent episode (CMS-HCC)    Intestinal infection due to Clostridium difficile    Female stress incontinence    Congestive heart failure (Multi)    Closed Colles'  fracture    Candidiasis of vulva and vagina    Aortic valve disorder    Adhesive capsulitis of shoulder    Abnormality of gait    Encounter for power mobility device assessment         No results found for this or any previous visit (from the past 672 hour(s)).     Objective    Visit Vitals    Visit Vitals  BP 98/56   Pulse 62   Temp 36.1 °C (97 °F)   Resp 22   SpO2 96%   Smoking Status Every Day       Weight - 140 lbs  Height - not obtainable    Physical Exam     General - Not in acute distress and cooperative.  Build & Nutrition - Well developed  Posture - Normal  Gait - Normal  Mental Status - alert and oriented x 3    Head - Normocephalic    Neck - Thyroid normal size    Eyes - Bilateral - Sclera clear and lids pink without edema or mass.      Skin - Warm and dry with no rashes on visible skin    Lungs - Clear to auscultation and normal breathing effort    Cardiovascular - RRR and no murmurs, rubs or thrill.    Patient has a left leg below the knee amputation and the stump is healthy with no signs of infection or ulcers.  She has a right leg above-the-knee amputation and the stump is healthy with no signs of infection or ulcers.  Patient does have significant postural asymmetry.    Hands-she has decreased  strength and range of motion in her hands.    Arms-strength is 1 out of 4 in her elbows and shoulders and wrists.  Patient is able to perform a functional weight shift.    Neuropsychiatric - normal mood and affect      Assessment    1. Encounter for power mobility device assessment     This face-to-face evaluation was completed by me today.  The patient can safely operate a power mobility device both mentally and physically because she has been doing this since 2007 after her second amputation.  She is very motivated to continue to live independently.  She has significant postural asymmetry and is able to perform a functional weight shift.  I am recommending a power mobility device because a power mobility  device is necessary to get to the bathroom and toilet and to get to the kitchen to prepare meals and cook and get to the bedroom to groom and dress and sleep.  I am recommending a rigid backrest to support the client in a safe pain-free position for being properly aligned for optimal propulsion.  Recommend a pressure relieving cushion that positions the patient safely in the chair.  Without this cushion she is at very high risk for skin breakdown and potential infections and hospitalization.  I would recommend a left leg tilt on the wheelchair because she has a left leg below the knee amputation and this will optimize positioning.  I recommend a manual adjustable arm height so she can self transfer for toileting and showers and bed.  Her current power mobility device has a joystick interface and I would recommend this in her new power mobility device.  She will also need 2 batteries and a  that is easily accessible for her from her seated position.               No orders of the defined types were placed in this encounter.       No orders of the defined types were placed in this encounter.

## 2024-09-09 PROBLEM — Z76.89 ENCOUNTER FOR POWER MOBILITY DEVICE ASSESSMENT: Status: ACTIVE | Noted: 2024-09-09

## 2024-09-20 ENCOUNTER — TELEPHONE (OUTPATIENT)
Dept: PRIMARY CARE | Facility: CLINIC | Age: 74
End: 2024-09-20
Payer: MEDICARE

## 2024-09-20 NOTE — TELEPHONE ENCOUNTER
[employer] called stating they faxed over a form with what pt is getting and pricing. They need doctor signature and date to submit it to insurance.

## 2024-09-30 ENCOUNTER — TELEPHONE (OUTPATIENT)
Dept: PRIMARY CARE | Facility: CLINIC | Age: 74
End: 2024-09-30
Payer: MEDICARE

## 2024-09-30 NOTE — TELEPHONE ENCOUNTER
Per Tracee with [employer]: Req an addendum to office note from 9/06/2024, or letter, or script that says: 1) Pt can perform functional weight shift, and 2) pt has postural asymmetry. Fax to: [phone], ref#: [id number], Ph#: [phone]

## 2024-12-02 DIAGNOSIS — F31.9 BIPOLAR DEPRESSION (MULTI): ICD-10-CM

## 2024-12-02 RX ORDER — BUPROPION HYDROCHLORIDE 150 MG/1
150 TABLET, EXTENDED RELEASE ORAL 2 TIMES DAILY
Qty: 60 TABLET | Refills: 0 | Status: SHIPPED | OUTPATIENT
Start: 2024-12-02 | End: 2024-12-04 | Stop reason: SDUPTHER

## 2024-12-04 ENCOUNTER — APPOINTMENT (OUTPATIENT)
Dept: PRIMARY CARE | Facility: CLINIC | Age: 74
End: 2024-12-04
Payer: MEDICARE

## 2024-12-04 VITALS
RESPIRATION RATE: 18 BRPM | TEMPERATURE: 98.4 F | HEART RATE: 55 BPM | DIASTOLIC BLOOD PRESSURE: 70 MMHG | SYSTOLIC BLOOD PRESSURE: 144 MMHG

## 2024-12-04 DIAGNOSIS — K21.9 GASTROESOPHAGEAL REFLUX DISEASE WITHOUT ESOPHAGITIS: ICD-10-CM

## 2024-12-04 DIAGNOSIS — R52 PHANTOM PAIN: ICD-10-CM

## 2024-12-04 DIAGNOSIS — G43.909 MIGRAINE SYNDROME: ICD-10-CM

## 2024-12-04 DIAGNOSIS — G54.8 PHANTOM PAIN: ICD-10-CM

## 2024-12-04 DIAGNOSIS — F31.9 BIPOLAR DEPRESSION (MULTI): ICD-10-CM

## 2024-12-04 DIAGNOSIS — I10 BENIGN ESSENTIAL HYPERTENSION: ICD-10-CM

## 2024-12-04 DIAGNOSIS — R05.3 CHRONIC COUGH: ICD-10-CM

## 2024-12-04 DIAGNOSIS — N39.46 MIXED STRESS AND URGE URINARY INCONTINENCE: ICD-10-CM

## 2024-12-04 PROCEDURE — 1158F ADVNC CARE PLAN TLK DOCD: CPT | Performed by: FAMILY MEDICINE

## 2024-12-04 PROCEDURE — 3078F DIAST BP <80 MM HG: CPT | Performed by: FAMILY MEDICINE

## 2024-12-04 PROCEDURE — G2211 COMPLEX E/M VISIT ADD ON: HCPCS | Performed by: FAMILY MEDICINE

## 2024-12-04 PROCEDURE — 1123F ACP DISCUSS/DSCN MKR DOCD: CPT | Performed by: FAMILY MEDICINE

## 2024-12-04 PROCEDURE — 99214 OFFICE O/P EST MOD 30 MIN: CPT | Performed by: FAMILY MEDICINE

## 2024-12-04 PROCEDURE — 3077F SYST BP >= 140 MM HG: CPT | Performed by: FAMILY MEDICINE

## 2024-12-04 PROCEDURE — 1159F MED LIST DOCD IN RCRD: CPT | Performed by: FAMILY MEDICINE

## 2024-12-04 PROCEDURE — 1160F RVW MEDS BY RX/DR IN RCRD: CPT | Performed by: FAMILY MEDICINE

## 2024-12-04 RX ORDER — LANSOPRAZOLE 30 MG/1
30 CAPSULE, DELAYED RELEASE ORAL DAILY
Qty: 90 CAPSULE | Refills: 1 | Status: SHIPPED | OUTPATIENT
Start: 2024-12-04

## 2024-12-04 RX ORDER — GABAPENTIN 300 MG/1
300 CAPSULE ORAL 3 TIMES DAILY
Qty: 270 CAPSULE | Refills: 1 | Status: SHIPPED | OUTPATIENT
Start: 2024-12-04

## 2024-12-04 RX ORDER — CARVEDILOL 12.5 MG/1
12.5 TABLET ORAL 2 TIMES DAILY
Qty: 180 TABLET | Refills: 1 | Status: SHIPPED | OUTPATIENT
Start: 2024-12-04

## 2024-12-04 RX ORDER — AMITRIPTYLINE HYDROCHLORIDE 50 MG/1
50 TABLET, FILM COATED ORAL NIGHTLY
Qty: 90 TABLET | Refills: 1 | Status: SHIPPED | OUTPATIENT
Start: 2024-12-04

## 2024-12-04 RX ORDER — QUETIAPINE FUMARATE 50 MG/1
50 TABLET, FILM COATED ORAL NIGHTLY
Qty: 90 TABLET | Refills: 1 | Status: SHIPPED | OUTPATIENT
Start: 2024-12-04

## 2024-12-04 RX ORDER — SERTRALINE HYDROCHLORIDE 100 MG/1
200 TABLET, FILM COATED ORAL DAILY
Qty: 180 TABLET | Refills: 1 | Status: SHIPPED | OUTPATIENT
Start: 2024-12-04

## 2024-12-04 RX ORDER — BUPROPION HYDROCHLORIDE 150 MG/1
150 TABLET, EXTENDED RELEASE ORAL 2 TIMES DAILY
Qty: 180 TABLET | Refills: 1 | Status: SHIPPED | OUTPATIENT
Start: 2024-12-04

## 2024-12-04 RX ORDER — OXYBUTYNIN CHLORIDE 15 MG/1
15 TABLET, EXTENDED RELEASE ORAL DAILY
Qty: 90 TABLET | Refills: 1 | Status: SHIPPED | OUTPATIENT
Start: 2024-12-04

## 2024-12-04 RX ORDER — AMLODIPINE AND BENAZEPRIL HYDROCHLORIDE 5; 10 MG/1; MG/1
1 CAPSULE ORAL DAILY
Qty: 90 CAPSULE | Refills: 1 | Status: SHIPPED | OUTPATIENT
Start: 2024-12-04

## 2024-12-04 NOTE — ASSESSMENT & PLAN NOTE
Condition well controlled.  No change in current treatment regimen.  Refill given of current medication.  Make a follow up appointment with me for recheck in 6 months.  Orders:    amitriptyline (Elavil) 50 mg tablet; Take 1 tablet (50 mg) by mouth once daily at bedtime.

## 2024-12-04 NOTE — ASSESSMENT & PLAN NOTE
Condition well controlled.  No change in current treatment regimen.  Refill given of current medication.  Make a follow up appointment with me for recheck in 6 months.  Orders:    oxybutynin XL (Ditropan-XL) 15 mg 24 hr tablet; Take 1 tablet (15 mg) by mouth once daily.

## 2024-12-04 NOTE — PROGRESS NOTES
Sharon Rome is a 74 y.o. female here today for   Chief Complaint   Patient presents with    Hypertension    GERD    Migraine    Bipolar    phanton pain    Urinary Incontinence    Cough    swelling     Wrist and hands         HPI     HTN recheck -- Patient denies chest pain, SOB, edema, palpitations on review.  Taking medication correctly and denies any side effects.    GERD recheck -- Patient reports GI symptoms are well controlled with current treatment.  No heartburn, chest pain, vomiting, diarrhea.  No SE's from current treatment.  Patient wishes to continue the same treatment.   No breakthrough sxs.      Migraine HA's - stable with rare HA's.  No problems with medications.  No new headaches and no focal neurological deficits.  Very rare migraines.      Mood disorder recheck -- Patient feels like condition is well controlled.  Has good control of mood and emotional reactions.  No SE's or problems with medications.  No homicidal or suicidal ideation.  Patient wishes to continue same medications.      Recheck phantom pain --she says this is relatively well-controlled with the gabapentin.    Recheck incontinence --this is stable with her current medications.    She has been out of methotrexate for a few months.  Rxed from Dr. Watts.  OA of hands and wrists worse and getting flares with swelling.      At her last visit we had done an assessment for a new power wheelchair but she still has not received her wheelchair yet.  [facility] but she is not sure what the delay is.      Current Outpatient Medications:     folic acid (Folvite) 1 mg tablet, Take 1 tablet (1 mg) by mouth once daily., Disp: , Rfl:     methotrexate (Trexall) 2.5 mg tablet, Take 1 tablet (2.5 mg total) by mouth.  6 tablets weekly, Disp: , Rfl:     amitriptyline (Elavil) 50 mg tablet, Take 1 tablet (50 mg) by mouth once daily at bedtime., Disp: 90 tablet, Rfl: 1    amLODIPine-benazepriL (Lotrel) 5-10 mg  capsule, Take 1 capsule by mouth once daily., Disp: 90 capsule, Rfl: 1    buPROPion SR (Wellbutrin SR) 150 mg 12 hr tablet, Take 1 tablet (150 mg) by mouth 2 times a day., Disp: 180 tablet, Rfl: 1    carvedilol (Coreg) 12.5 mg tablet, Take 1 tablet (12.5 mg) by mouth 2 times a day., Disp: 180 tablet, Rfl: 1    gabapentin (Neurontin) 300 mg capsule, Take 1 capsule (300 mg) by mouth 3 times a day., Disp: 270 capsule, Rfl: 1    ipratropium-albuteroL (Combivent Respimat)  mcg/actuation inhaler, Inhale 1 puff 4 times a day., Disp: 12 g, Rfl: 1    lansoprazole (Prevacid) 30 mg DR capsule, Take 1 capsule (30 mg) by mouth once daily., Disp: 90 capsule, Rfl: 1    oxybutynin XL (Ditropan-XL) 15 mg 24 hr tablet, Take 1 tablet (15 mg) by mouth once daily., Disp: 90 tablet, Rfl: 1    QUEtiapine (SEROquel) 50 mg tablet, Take 1 tablet (50 mg) by mouth once daily at bedtime., Disp: 90 tablet, Rfl: 1    sertraline (Zoloft) 100 mg tablet, Take 2 tablets (200 mg) by mouth once daily., Disp: 180 tablet, Rfl: 1    tiZANidine (Zanaflex) 4 mg tablet, Take 1 tablet (4 mg) by mouth 2 times a day as needed for muscle spasms., Disp: , Rfl:     Patient Active Problem List   Diagnosis    Amputation above knee (Multi)    Amputation below knee (Multi)    Primary hypertension    Bipolar depression (Multi)    CAD (coronary artery disease)    Chronic pain    GERD without esophagitis    Hyperthyroidism    Incontinence of urine    Phantom pain    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 (Multi)    Ulcerative colitis without complications, unspecified location (Multi)    Primary insomnia    Migraine syndrome    Peripheral vascular disease, unspecified (CMS-HCC)    Varicose veins of lower extremities with ulcer    Urge incontinence    Unspecified cataract    Tobacco use disorder    Scabies    Rheumatoid arthritis    Rheumatic fever    Pyoderma gangrenosum (Multi)    Phantom limb syndrome (Multi)    Osteoporosis     Osteoarthritis    Major depressive disorder, recurrent episode (CMS-HCC)    Intestinal infection due to Clostridium difficile    Female stress incontinence    Congestive heart failure    Closed Colles' fracture    Candidiasis of vulva and vagina    Aortic valve disorder    Adhesive capsulitis of shoulder    Abnormality of gait    Encounter for power mobility device assessment         Objective    Visit Vitals    Visit Vitals  /70   Pulse 55   Temp 36.9 °C (98.4 °F)   Resp 18   Smoking Status Every Day       There is no height or weight on file to calculate BMI.     Physical Exam     General - Not in acute distress and cooperative.  Build & Nutrition - Well developed  Posture - Normal  Mental Status - alert and oriented x 3    Head - Normocephalic    Neck - Thyroid normal size    Eyes - Bilateral - Sclera clear and lids pink without edema or mass.      Skin - Warm and dry with no rashes on visible skin    Lungs - Clear to auscultation and normal breathing effort    Cardiovascular - RRR and no murmurs, rubs or thrill.      Neuropsychiatric - normal mood and affect        Assessment & Plan  Migraine syndrome  Condition well controlled.  No change in current treatment regimen.  Refill given of current medication.  Make a follow up appointment with me for recheck in 6 months.  Orders:    amitriptyline (Elavil) 50 mg tablet; Take 1 tablet (50 mg) by mouth once daily at bedtime.    Benign essential hypertension  Condition well controlled.  No change in current treatment regimen.  Refill given of current medication.  Appropriate labs ordered or reviewed.  Make a follow up appointment with me for recheck in 6 months.  Orders:    amLODIPine-benazepriL (Lotrel) 5-10 mg capsule; Take 1 capsule by mouth once daily.    carvedilol (Coreg) 12.5 mg tablet; Take 1 tablet (12.5 mg) by mouth 2 times a day.    Bipolar depression (Multi)  Condition well controlled.  No change in current treatment regimen.  Refill given of current  medication.  Make a follow up appointment with me for recheck in 6 months.  Orders:    buPROPion SR (Wellbutrin SR) 150 mg 12 hr tablet; Take 1 tablet (150 mg) by mouth 2 times a day.    QUEtiapine (SEROquel) 50 mg tablet; Take 1 tablet (50 mg) by mouth once daily at bedtime.    sertraline (Zoloft) 100 mg tablet; Take 2 tablets (200 mg) by mouth once daily.    Phantom pain  Condition well controlled.  No change in current treatment regimen.  Refill given of current medication.  Make a follow up appointment with me for recheck in 6 months.  Orders:    gabapentin (Neurontin) 300 mg capsule; Take 1 capsule (300 mg) by mouth 3 times a day.    Chronic cough  Condition well controlled.  No change in current treatment regimen.  Refill given of current medication.  Make a follow up appointment with me for recheck in 6 months.  Orders:    ipratropium-albuteroL (Combivent Respimat)  mcg/actuation inhaler; Inhale 1 puff 4 times a day.    Gastroesophageal reflux disease without esophagitis  Condition well controlled.  No change in current treatment regimen.  Refill given of current medication.  Make a follow up appointment with me for recheck in 6 months.  Orders:    lansoprazole (Prevacid) 30 mg DR capsule; Take 1 capsule (30 mg) by mouth once daily.    Mixed stress and urge urinary incontinence  Condition well controlled.  No change in current treatment regimen.  Refill given of current medication.  Make a follow up appointment with me for recheck in 6 months.  Orders:    oxybutynin XL (Ditropan-XL) 15 mg 24 hr tablet; Take 1 tablet (15 mg) by mouth once daily.      I discussed with my staff and they will contact the people from [facility] to see what the delay is about getting her a new power wheelchair.  The patient was very grateful.    Orders Placed This Encounter      amitriptyline (Elavil) 50 mg tablet      amLODIPine-benazepriL (Lotrel) 5-10 mg capsule      buPROPion SR (Wellbutrin SR) 150 mg 12 hr tablet       carvedilol (Coreg) 12.5 mg tablet      gabapentin (Neurontin) 300 mg capsule      ipratropium-albuteroL (Combivent Respimat)  mcg/actuation inhaler      lansoprazole (Prevacid) 30 mg DR capsule      oxybutynin XL (Ditropan-XL) 15 mg 24 hr tablet      QUEtiapine (SEROquel) 50 mg tablet      sertraline (Zoloft) 100 mg tablet       No orders of the defined types were placed in this encounter.       New Medications Ordered This Visit   Medications    amitriptyline (Elavil) 50 mg tablet     Sig: Take 1 tablet (50 mg) by mouth once daily at bedtime.     Dispense:  90 tablet     Refill:  1    amLODIPine-benazepriL (Lotrel) 5-10 mg capsule     Sig: Take 1 capsule by mouth once daily.     Dispense:  90 capsule     Refill:  1    buPROPion SR (Wellbutrin SR) 150 mg 12 hr tablet     Sig: Take 1 tablet (150 mg) by mouth 2 times a day.     Dispense:  180 tablet     Refill:  1    carvedilol (Coreg) 12.5 mg tablet     Sig: Take 1 tablet (12.5 mg) by mouth 2 times a day.     Dispense:  180 tablet     Refill:  1    gabapentin (Neurontin) 300 mg capsule     Sig: Take 1 capsule (300 mg) by mouth 3 times a day.     Dispense:  270 capsule     Refill:  1    ipratropium-albuteroL (Combivent Respimat)  mcg/actuation inhaler     Sig: Inhale 1 puff 4 times a day.     Dispense:  12 g     Refill:  1    lansoprazole (Prevacid) 30 mg DR capsule     Sig: Take 1 capsule (30 mg) by mouth once daily.     Dispense:  90 capsule     Refill:  1    oxybutynin XL (Ditropan-XL) 15 mg 24 hr tablet     Sig: Take 1 tablet (15 mg) by mouth once daily.     Dispense:  90 tablet     Refill:  1    QUEtiapine (SEROquel) 50 mg tablet     Sig: Take 1 tablet (50 mg) by mouth once daily at bedtime.     Dispense:  90 tablet     Refill:  1    sertraline (Zoloft) 100 mg tablet     Sig: Take 2 tablets (200 mg) by mouth once daily.     Dispense:  180 tablet     Refill:  1

## 2024-12-04 NOTE — ASSESSMENT & PLAN NOTE
Condition well controlled.  No change in current treatment regimen.  Refill given of current medication.  Make a follow up appointment with me for recheck in 6 months.  Orders:    gabapentin (Neurontin) 300 mg capsule; Take 1 capsule (300 mg) by mouth 3 times a day.

## 2024-12-04 NOTE — ASSESSMENT & PLAN NOTE
Condition well controlled.  No change in current treatment regimen.  Refill given of current medication.  Make a follow up appointment with me for recheck in 6 months.  Orders:    buPROPion SR (Wellbutrin SR) 150 mg 12 hr tablet; Take 1 tablet (150 mg) by mouth 2 times a day.    QUEtiapine (SEROquel) 50 mg tablet; Take 1 tablet (50 mg) by mouth once daily at bedtime.    sertraline (Zoloft) 100 mg tablet; Take 2 tablets (200 mg) by mouth once daily.

## 2024-12-05 ENCOUNTER — TELEPHONE (OUTPATIENT)
Dept: PRIMARY CARE | Facility: CLINIC | Age: 74
End: 2024-12-05
Payer: MEDICARE

## 2024-12-05 NOTE — TELEPHONE ENCOUNTER
JAZLYN: Called [facility] ([phone]) to check on status of wheelchair repairs. We had faxed a Detailed Product Description to them on 10/24/2024 and as of 12/4/2024 pt still waiting to hear from [facility]. Spoke with Joya: They have all the paperwork they need from us right now. The next step is for someone with them to go out to see the pt and evaluate her for PT/OT/ATP and then they will send us one more fax to sign off on. Joya will send an email to the scheduling dept to call the pt today to schedule that visit.

## 2024-12-30 ENCOUNTER — LAB (OUTPATIENT)
Dept: LAB | Facility: LAB | Age: 74
End: 2024-12-30
Payer: MEDICARE

## 2024-12-30 DIAGNOSIS — M05.79 RHEUMATOID ARTHRITIS WITH RHEUMATOID FACTOR OF MULTIPLE SITES WITHOUT ORGAN OR SYSTEMS INVOLVEMENT (MULTI): Primary | ICD-10-CM

## 2024-12-30 DIAGNOSIS — I10 ESSENTIAL (PRIMARY) HYPERTENSION: ICD-10-CM

## 2024-12-30 DIAGNOSIS — I10 BENIGN ESSENTIAL HYPERTENSION: ICD-10-CM

## 2024-12-30 LAB
ALBUMIN SERPL BCP-MCNC: 3.4 G/DL (ref 3.4–5)
ALP SERPL-CCNC: 133 U/L (ref 33–136)
ALT SERPL W P-5'-P-CCNC: 7 U/L (ref 7–45)
ANION GAP SERPL CALC-SCNC: 12 MMOL/L (ref 10–20)
AST SERPL W P-5'-P-CCNC: 15 U/L (ref 9–39)
BILIRUB SERPL-MCNC: 0.3 MG/DL (ref 0–1.2)
BUN SERPL-MCNC: 18 MG/DL (ref 6–23)
CALCIUM SERPL-MCNC: 9.2 MG/DL (ref 8.6–10.3)
CHLORIDE SERPL-SCNC: 106 MMOL/L (ref 98–107)
CHOLEST SERPL-MCNC: 132 MG/DL (ref 0–199)
CHOLESTEROL/HDL RATIO: 3.5
CO2 SERPL-SCNC: 21 MMOL/L (ref 21–32)
CREAT SERPL-MCNC: 0.69 MG/DL (ref 0.5–1.05)
CRP SERPL-MCNC: 2.35 MG/DL
EGFRCR SERPLBLD CKD-EPI 2021: 90 ML/MIN/1.73M*2
ERYTHROCYTE [DISTWIDTH] IN BLOOD BY AUTOMATED COUNT: 15 % (ref 11.5–14.5)
ERYTHROCYTE [SEDIMENTATION RATE] IN BLOOD BY WESTERGREN METHOD: 62 MM/H (ref 0–30)
GLUCOSE SERPL-MCNC: 97 MG/DL (ref 74–99)
HCT VFR BLD AUTO: 29.8 % (ref 36–46)
HDLC SERPL-MCNC: 37.4 MG/DL
HGB BLD-MCNC: 9.6 G/DL (ref 12–16)
LDLC SERPL CALC-MCNC: 67 MG/DL
MCH RBC QN AUTO: 29.3 PG (ref 26–34)
MCHC RBC AUTO-ENTMCNC: 32.2 G/DL (ref 32–36)
MCV RBC AUTO: 91 FL (ref 80–100)
NON HDL CHOLESTEROL: 95 MG/DL (ref 0–149)
NRBC BLD-RTO: 0 /100 WBCS (ref 0–0)
PLATELET # BLD AUTO: 311 X10*3/UL (ref 150–450)
POTASSIUM SERPL-SCNC: 4.7 MMOL/L (ref 3.5–5.3)
PROT SERPL-MCNC: 6.8 G/DL (ref 6.4–8.2)
RBC # BLD AUTO: 3.28 X10*6/UL (ref 4–5.2)
SODIUM SERPL-SCNC: 134 MMOL/L (ref 136–145)
TRIGL SERPL-MCNC: 137 MG/DL (ref 0–149)
VLDL: 27 MG/DL (ref 0–40)
WBC # BLD AUTO: 11.2 X10*3/UL (ref 4.4–11.3)

## 2024-12-30 PROCEDURE — 80061 LIPID PANEL: CPT

## 2024-12-30 PROCEDURE — 86140 C-REACTIVE PROTEIN: CPT

## 2024-12-30 PROCEDURE — 85027 COMPLETE CBC AUTOMATED: CPT

## 2024-12-30 PROCEDURE — 85652 RBC SED RATE AUTOMATED: CPT

## 2024-12-30 PROCEDURE — 80053 COMPREHEN METABOLIC PANEL: CPT

## 2025-01-06 ENCOUNTER — TELEPHONE (OUTPATIENT)
Dept: PRIMARY CARE | Facility: CLINIC | Age: 75
End: 2025-01-06
Payer: MEDICARE

## 2025-01-06 DIAGNOSIS — D64.9 ANEMIA, UNSPECIFIED TYPE: ICD-10-CM

## 2025-01-06 NOTE — TELEPHONE ENCOUNTER
----- Message from Nasim Bernice sent at 1/6/2025  8:18 AM EST -----  Please inform the patient that her recent labs were essentially normal except she has moderate anemia.  Her hemoglobin is 9.6 which means that she does not have enough red blood cells.  Please ask her if she has noticed any blood in her urine or in her stools?   Does she have a previous history of iron deficiency or anemia?  Please let me know her answers.

## 2025-01-13 RX ORDER — FERROUS SULFATE 325(65) MG
325 TABLET, DELAYED RELEASE (ENTERIC COATED) ORAL
Qty: 90 TABLET | Refills: 1 | Status: SHIPPED | OUTPATIENT
Start: 2025-01-13

## 2025-06-01 DIAGNOSIS — G54.8 PHANTOM PAIN: ICD-10-CM

## 2025-06-01 DIAGNOSIS — G43.909 MIGRAINE SYNDROME: ICD-10-CM

## 2025-06-01 DIAGNOSIS — K21.9 GASTROESOPHAGEAL REFLUX DISEASE WITHOUT ESOPHAGITIS: ICD-10-CM

## 2025-06-01 DIAGNOSIS — N39.46 MIXED STRESS AND URGE URINARY INCONTINENCE: ICD-10-CM

## 2025-06-01 DIAGNOSIS — F31.9 BIPOLAR DEPRESSION (MULTI): ICD-10-CM

## 2025-06-01 DIAGNOSIS — R52 PHANTOM PAIN: ICD-10-CM

## 2025-06-01 DIAGNOSIS — I10 BENIGN ESSENTIAL HYPERTENSION: ICD-10-CM

## 2025-06-02 RX ORDER — GABAPENTIN 300 MG/1
300 CAPSULE ORAL 3 TIMES DAILY
Qty: 90 CAPSULE | Refills: 0 | Status: SHIPPED | OUTPATIENT
Start: 2025-06-02

## 2025-06-02 RX ORDER — LANSOPRAZOLE 30 MG/1
30 CAPSULE, DELAYED RELEASE ORAL DAILY
Qty: 30 CAPSULE | Refills: 0 | Status: SHIPPED | OUTPATIENT
Start: 2025-06-02

## 2025-06-02 RX ORDER — AMLODIPINE AND BENAZEPRIL HYDROCHLORIDE 5; 10 MG/1; MG/1
1 CAPSULE ORAL DAILY
Qty: 30 CAPSULE | Refills: 0 | Status: SHIPPED | OUTPATIENT
Start: 2025-06-02

## 2025-06-02 RX ORDER — SERTRALINE HYDROCHLORIDE 100 MG/1
200 TABLET, FILM COATED ORAL DAILY
Qty: 60 TABLET | Refills: 0 | Status: SHIPPED | OUTPATIENT
Start: 2025-06-02

## 2025-06-02 RX ORDER — CARVEDILOL 12.5 MG/1
12.5 TABLET ORAL 2 TIMES DAILY
Qty: 60 TABLET | Refills: 0 | Status: SHIPPED | OUTPATIENT
Start: 2025-06-02

## 2025-06-02 RX ORDER — OXYBUTYNIN CHLORIDE 15 MG/1
15 TABLET, EXTENDED RELEASE ORAL DAILY
Qty: 30 TABLET | Refills: 0 | Status: SHIPPED | OUTPATIENT
Start: 2025-06-02

## 2025-06-02 RX ORDER — QUETIAPINE FUMARATE 50 MG/1
50 TABLET, FILM COATED ORAL NIGHTLY
Qty: 30 TABLET | Refills: 0 | Status: SHIPPED | OUTPATIENT
Start: 2025-06-02

## 2025-06-02 RX ORDER — AMITRIPTYLINE HYDROCHLORIDE 50 MG/1
50 TABLET, FILM COATED ORAL NIGHTLY
Qty: 30 TABLET | Refills: 0 | Status: SHIPPED | OUTPATIENT
Start: 2025-06-02

## 2025-06-04 ENCOUNTER — APPOINTMENT (OUTPATIENT)
Dept: PRIMARY CARE | Facility: CLINIC | Age: 75
End: 2025-06-04
Payer: MEDICARE

## 2025-06-12 ENCOUNTER — APPOINTMENT (OUTPATIENT)
Dept: PRIMARY CARE | Facility: CLINIC | Age: 75
End: 2025-06-12
Payer: MEDICARE

## 2025-06-12 VITALS
DIASTOLIC BLOOD PRESSURE: 74 MMHG | OXYGEN SATURATION: 97 % | SYSTOLIC BLOOD PRESSURE: 146 MMHG | RESPIRATION RATE: 20 BRPM | HEART RATE: 72 BPM | TEMPERATURE: 98 F

## 2025-06-12 DIAGNOSIS — G43.909 MIGRAINE SYNDROME: ICD-10-CM

## 2025-06-12 DIAGNOSIS — R52 PHANTOM PAIN: ICD-10-CM

## 2025-06-12 DIAGNOSIS — G54.8 PHANTOM PAIN: ICD-10-CM

## 2025-06-12 DIAGNOSIS — F33.0 MILD EPISODE OF RECURRENT MAJOR DEPRESSIVE DISORDER: ICD-10-CM

## 2025-06-12 DIAGNOSIS — Z89.611 ACQUIRED ABSENCE OF RIGHT LEG ABOVE KNEE (MULTI): ICD-10-CM

## 2025-06-12 DIAGNOSIS — N39.46 MIXED STRESS AND URGE URINARY INCONTINENCE: ICD-10-CM

## 2025-06-12 DIAGNOSIS — K21.9 GASTROESOPHAGEAL REFLUX DISEASE WITHOUT ESOPHAGITIS: ICD-10-CM

## 2025-06-12 DIAGNOSIS — I50.9 CONGESTIVE HEART FAILURE, UNSPECIFIED HF CHRONICITY, UNSPECIFIED HEART FAILURE TYPE: ICD-10-CM

## 2025-06-12 DIAGNOSIS — R05.3 CHRONIC COUGH: ICD-10-CM

## 2025-06-12 DIAGNOSIS — F31.9 BIPOLAR DEPRESSION (MULTI): ICD-10-CM

## 2025-06-12 DIAGNOSIS — M05.79 RHEUMATOID ARTHRITIS WITH RHEUMATOID FACTOR OF MULTIPLE SITES WITHOUT ORGAN OR SYSTEMS INVOLVEMENT (MULTI): ICD-10-CM

## 2025-06-12 DIAGNOSIS — S78.119A AMPUTATION ABOVE KNEE (MULTI): Primary | ICD-10-CM

## 2025-06-12 DIAGNOSIS — Z89.512 ACQUIRED ABSENCE OF LEFT LEG BELOW KNEE (MULTI): ICD-10-CM

## 2025-06-12 DIAGNOSIS — I10 PRIMARY HYPERTENSION: ICD-10-CM

## 2025-06-12 PROBLEM — K51.90 ULCERATIVE COLITIS WITHOUT COMPLICATIONS, UNSPECIFIED LOCATION (MULTI): Status: RESOLVED | Noted: 2023-08-22 | Resolved: 2025-06-12

## 2025-06-12 PROBLEM — M05.50 RHEUMATOID POLYNEUROPATHY WITH RHEUMATOID ARTHRITIS OF UNSPECIFIED SITE (MULTI): Status: RESOLVED | Noted: 2023-08-22 | Resolved: 2025-06-12

## 2025-06-12 PROCEDURE — 1159F MED LIST DOCD IN RCRD: CPT | Performed by: FAMILY MEDICINE

## 2025-06-12 PROCEDURE — 1123F ACP DISCUSS/DSCN MKR DOCD: CPT | Performed by: FAMILY MEDICINE

## 2025-06-12 PROCEDURE — 3078F DIAST BP <80 MM HG: CPT | Performed by: FAMILY MEDICINE

## 2025-06-12 PROCEDURE — G2211 COMPLEX E/M VISIT ADD ON: HCPCS | Performed by: FAMILY MEDICINE

## 2025-06-12 PROCEDURE — 3077F SYST BP >= 140 MM HG: CPT | Performed by: FAMILY MEDICINE

## 2025-06-12 PROCEDURE — 4004F PT TOBACCO SCREEN RCVD TLK: CPT | Performed by: FAMILY MEDICINE

## 2025-06-12 PROCEDURE — 99214 OFFICE O/P EST MOD 30 MIN: CPT | Performed by: FAMILY MEDICINE

## 2025-06-12 PROCEDURE — 1160F RVW MEDS BY RX/DR IN RCRD: CPT | Performed by: FAMILY MEDICINE

## 2025-06-12 RX ORDER — BUPROPION HYDROCHLORIDE 150 MG/1
150 TABLET, EXTENDED RELEASE ORAL 2 TIMES DAILY
Qty: 180 TABLET | Refills: 1 | Status: SHIPPED | OUTPATIENT
Start: 2025-06-12

## 2025-06-12 RX ORDER — AMLODIPINE AND BENAZEPRIL HYDROCHLORIDE 5; 10 MG/1; MG/1
1 CAPSULE ORAL DAILY
Qty: 90 CAPSULE | Refills: 1 | Status: SHIPPED | OUTPATIENT
Start: 2025-06-12

## 2025-06-12 RX ORDER — QUETIAPINE FUMARATE 50 MG/1
50 TABLET, FILM COATED ORAL NIGHTLY
Qty: 90 TABLET | Refills: 1 | Status: SHIPPED | OUTPATIENT
Start: 2025-06-12

## 2025-06-12 RX ORDER — CARVEDILOL 12.5 MG/1
12.5 TABLET ORAL 2 TIMES DAILY
Qty: 180 TABLET | Refills: 1 | Status: SHIPPED | OUTPATIENT
Start: 2025-06-12

## 2025-06-12 RX ORDER — LANSOPRAZOLE 30 MG/1
30 CAPSULE, DELAYED RELEASE ORAL DAILY
Qty: 90 CAPSULE | Refills: 1 | Status: SHIPPED | OUTPATIENT
Start: 2025-06-12

## 2025-06-12 RX ORDER — OXYBUTYNIN CHLORIDE 15 MG/1
15 TABLET, EXTENDED RELEASE ORAL DAILY
Qty: 90 TABLET | Refills: 1 | Status: SHIPPED | OUTPATIENT
Start: 2025-06-12

## 2025-06-12 RX ORDER — SERTRALINE HYDROCHLORIDE 100 MG/1
200 TABLET, FILM COATED ORAL DAILY
Qty: 180 TABLET | Refills: 1 | Status: SHIPPED | OUTPATIENT
Start: 2025-06-12

## 2025-06-12 RX ORDER — GABAPENTIN 300 MG/1
300 CAPSULE ORAL 3 TIMES DAILY
Qty: 270 CAPSULE | Refills: 1 | Status: SHIPPED | OUTPATIENT
Start: 2025-06-12

## 2025-06-12 RX ORDER — AMITRIPTYLINE HYDROCHLORIDE 50 MG/1
50 TABLET, FILM COATED ORAL NIGHTLY
Qty: 90 TABLET | Refills: 1 | Status: SHIPPED | OUTPATIENT
Start: 2025-06-12

## 2025-06-12 NOTE — ASSESSMENT & PLAN NOTE
Condition well controlled.  No change in current treatment regimen.  Refill given of current medication.  Appropriate labs ordered or reviewed.  Make a follow up appointment with me for recheck in 6 months.  Orders:    amLODIPine-benazepriL (Lotrel) 5-10 mg capsule; Take 1 capsule by mouth once daily.    carvedilol (Coreg) 12.5 mg tablet; Take 1 tablet (12.5 mg) by mouth 2 times a day.    Comprehensive Metabolic Panel; Future    CBC; Future    Lipid Panel; Future

## 2025-06-12 NOTE — ASSESSMENT & PLAN NOTE
Condition well controlled.  No change in current treatment regimen.  Refill given of current medication.  Make a follow up appointment with me for recheck in 6 months.  Orders:    amitriptyline (Elavil) 50 mg tablet; Take 1 tablet (50 mg) by mouth once daily at bedtime.     Siliq Counseling:  I discussed with the patient the risks of Siliq including but not limited to new or worsening depression, suicidal thoughts and behavior, immunosuppression, malignancy, posterior leukoencephalopathy syndrome, and serious infections.  The patient understands that monitoring is required including a PPD at baseline and must alert us or the primary physician if symptoms of infection or other concerning signs are noted. There is also a special program designed to monitor depression which is required with Siliq.

## 2025-06-12 NOTE — ASSESSMENT & PLAN NOTE
Condition seems well controlled.  No change in current treatment.  Orders:    amLODIPine-benazepriL (Lotrel) 5-10 mg capsule; Take 1 capsule by mouth once daily.    carvedilol (Coreg) 12.5 mg tablet; Take 1 tablet (12.5 mg) by mouth 2 times a day.

## 2025-06-12 NOTE — ASSESSMENT & PLAN NOTE
Orders:    buPROPion SR (Wellbutrin SR) 150 mg 12 hr tablet; Take 1 tablet (150 mg) by mouth 2 times a day.    QUEtiapine (SEROquel) 50 mg tablet; Take 1 tablet (50 mg) by mouth once daily at bedtime.    sertraline (Zoloft) 100 mg tablet; Take 2 tablets (200 mg) by mouth once daily.

## 2025-06-12 NOTE — PROGRESS NOTES
Sharon Rome is a 74 y.o. female here today for   Chief Complaint   Patient presents with    Hypertension    GERD    Migraine        HPI     HTN recheck -- Patient denies chest pain, SOB, edema, palpitations on review.  Taking medication correctly and denies any side effects.    GERD recheck -- Patient reports GI symptoms are well controlled with current treatment.  No heartburn, chest pain, vomiting, diarrhea.  No SE's from current treatment.  Patient wishes to continue the same treatment.       She now has her new mobility chair since February 2025.    UI - stable with medication.      Mood disorder recheck -- Patient feels like condition is well controlled.  Has good control of mood and emotional reactions.  No SE's or problems with medications.  No homicidal or suicidal ideation.  Patient wishes to continue same medications.      Migraine HA's - stable with rare HA's.  No problems with medications.  No new headaches and no focal neurological deficits.      COPD recheck -- COPD symptoms well controlled with current medications.  No significant flares.  No severe SOB, cough recently.  Patient wishes to continue the same medications.               Current Outpatient Medications   Medication Instructions    amitriptyline (ELAVIL) 50 mg, oral, Nightly    amLODIPine-benazepriL (Lotrel) 5-10 mg capsule 1 capsule, oral, Daily    buPROPion SR (WELLBUTRIN SR) 150 mg, oral, 2 times daily    carvedilol (COREG) 12.5 mg, oral, 2 times daily    ferrous sulfate 325 (65 Fe) MG EC tablet 1 tablet, oral, Daily with breakfast, Do not crush, chew, or split.    folic acid (FOLVITE) 1 mg, Daily    gabapentin (NEURONTIN) 300 mg, oral, 3 times daily    ipratropium-albuteroL (Combivent Respimat)  mcg/actuation inhaler 1 puff, inhalation, 4 times daily RT    lansoprazole (PREVACID) 30 mg, oral, Daily    methotrexate (TREXALL) 2.5 mg    oxyBUTYnin XL (DITROPAN-XL) 15 mg, oral, Daily    QUEtiapine (SEROQUEL) 50 mg, oral, Nightly     sertraline (ZOLOFT) 200 mg, oral, Daily    tiZANidine (ZANAFLEX) 4 mg, 2 times daily PRN       Patient Active Problem List    Diagnosis Date Noted    Migraine syndrome 08/22/2023    Amputation above knee (Multi) 07/18/2023    Amputation below knee (Multi) 07/18/2023    Primary hypertension 07/18/2023    Gastroesophageal reflux disease without esophagitis 07/18/2023    Urge incontinence 04/11/2006    Encounter for power mobility device assessment 09/09/2024    Peripheral vascular disease, unspecified 08/23/2023    Primary insomnia 08/22/2023    CAD (coronary artery disease) 07/18/2023    Chronic pain 07/18/2023    Hyperthyroidism 07/18/2023    Incontinence of urine 07/18/2023    Phantom pain 07/18/2023    Intestinal infection due to Clostridium difficile 12/07/2006    Candidiasis of vulva and vagina 11/27/2006    Aortic valve disorder 11/27/2006    Congestive heart failure 11/07/2006    Unspecified cataract 04/11/2006    Tobacco use disorder 04/11/2006    Rheumatic fever 04/11/2006    Osteoporosis 04/11/2006    Osteoarthritis 04/11/2006    Major depressive disorder, recurrent episode 04/11/2006    Female stress incontinence 04/11/2006    Closed Colles' fracture 04/11/2006    Adhesive capsulitis of shoulder 04/11/2006    Abnormality of gait 04/11/2006    Pyoderma gangrenosum (Multi) 11/05/2003         Objective      Visit Vitals    Visit Vitals  /74   Pulse 72   Temp 36.7 °C (98 °F)   Resp 20   SpO2 97%   Smoking Status Every Day       There is no height or weight on file to calculate BMI.     Physical Exam     General - Not in acute distress and cooperative.  Build & Nutrition - Well developed  Posture - Normal  Gait - Normal  Mental Status - alert and oriented x 3    Head - Normocephalic    Neck - Thyroid normal size    Eyes - Bilateral - Sclera clear and lids pink without edema or mass.      Skin - Warm and dry with no rashes on visible skin    Lungs - Clear to auscultation and normal breathing  effort    Cardiovascular - RRR and no murmurs, rubs or thrill.    Neuropsychiatric - normal mood and affect       Assessment & Plan  Primary hypertension  Condition well controlled.  No change in current treatment regimen.  Refill given of current medication.  Appropriate labs ordered or reviewed.  Make a follow up appointment with me for recheck in 6 months.  Orders:    amLODIPine-benazepriL (Lotrel) 5-10 mg capsule; Take 1 capsule by mouth once daily.    carvedilol (Coreg) 12.5 mg tablet; Take 1 tablet (12.5 mg) by mouth 2 times a day.    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  No change in current treatment.  Orders:    amLODIPine-benazepriL (Lotrel) 5-10 mg capsule; Take 1 capsule by mouth once daily.    carvedilol (Coreg) 12.5 mg tablet; Take 1 tablet (12.5 mg) by mouth 2 times a day.    Amputation above knee (Multi)         Mild episode of recurrent major depressive disorder    Orders:    buPROPion SR (Wellbutrin SR) 150 mg 12 hr tablet; Take 1 tablet (150 mg) by mouth 2 times a day.    QUEtiapine (SEROquel) 50 mg tablet; Take 1 tablet (50 mg) by mouth once daily at bedtime.    sertraline (Zoloft) 100 mg tablet; Take 2 tablets (200 mg) by mouth once daily.    Bipolar depression (Multi)  Condition well controlled.  No change in current treatment regimen.  Refill given of current medication.  Make a follow up appointment with me for recheck in 6 months.  Orders:    buPROPion SR (Wellbutrin SR) 150 mg 12 hr tablet; Take 1 tablet (150 mg) by mouth 2 times a day.    QUEtiapine (SEROquel) 50 mg  tablet; Take 1 tablet (50 mg) by mouth once daily at bedtime.    sertraline (Zoloft) 100 mg tablet; Take 2 tablets (200 mg) by mouth once daily.    Chronic cough  We will get a chest x-ray for further evaluation.  Orders:    ipratropium-albuteroL (Combivent Respimat)  mcg/actuation inhaler; Inhale 1 puff 4 times a day.    XR chest 2 views; Future    Gastroesophageal reflux disease without esophagitis  Condition well controlled.  No change in current treatment regimen.  Refill given of current medication.  Make a follow up appointment with me for recheck in 6 months.  Orders:    lansoprazole (Prevacid) 30 mg DR capsule; Take 1 capsule (30 mg) by mouth once daily.    Migraine syndrome  Condition well controlled.  No change in current treatment regimen.  Refill given of current medication.  Make a follow up appointment with me for recheck in 6 months.  Orders:    amitriptyline (Elavil) 50 mg tablet; Take 1 tablet (50 mg) by mouth once daily at bedtime.    Mixed stress and urge urinary incontinence  Condition well controlled.  No change in current treatment regimen.  Refill given of current medication.  Make a follow up appointment with me for recheck in 6 months.  Orders:    oxyBUTYnin XL (Ditropan-XL) 15 mg 24 hr tablet; Take 1 tablet (15 mg) by mouth once daily.    Phantom pain  Condition well controlled.  No change in current treatment regimen.  Refill given of current medication.  Make a follow up appointment with me for recheck in 6 months.  Orders:    gabapentin (Neurontin) 300 mg capsule; Take 1 capsule (300 mg) by mouth 3 times a day.         Orders Placed This Encounter      amitriptyline (Elavil) 50 mg tablet      amLODIPine-benazepriL (Lotrel) 5-10 mg capsule      buPROPion SR (Wellbutrin SR) 150 mg 12 hr tablet      carvedilol (Coreg) 12.5 mg tablet      gabapentin (Neurontin) 300 mg capsule      ipratropium-albuteroL (Combivent Respimat)  mcg/actuation inhaler      lansoprazole (Prevacid) 30 mg  DR capsule      oxyBUTYnin XL (Ditropan-XL) 15 mg 24 hr tablet      QUEtiapine (SEROquel) 50 mg tablet      sertraline (Zoloft) 100 mg tablet       Orders Placed This Encounter   Procedures    XR chest 2 views    Comprehensive Metabolic Panel    CBC    Lipid Panel        New Medications Ordered This Visit   Medications    amLODIPine-benazepriL (Lotrel) 5-10 mg capsule     Sig: Take 1 capsule by mouth once daily.     Dispense:  90 capsule     Refill:  1    carvedilol (Coreg) 12.5 mg tablet     Sig: Take 1 tablet (12.5 mg) by mouth 2 times a day.     Dispense:  180 tablet     Refill:  1    buPROPion SR (Wellbutrin SR) 150 mg 12 hr tablet     Sig: Take 1 tablet (150 mg) by mouth 2 times a day.     Dispense:  180 tablet     Refill:  1    QUEtiapine (SEROquel) 50 mg tablet     Sig: Take 1 tablet (50 mg) by mouth once daily at bedtime.     Dispense:  90 tablet     Refill:  1    sertraline (Zoloft) 100 mg tablet     Sig: Take 2 tablets (200 mg) by mouth once daily.     Dispense:  180 tablet     Refill:  1    ipratropium-albuteroL (Combivent Respimat)  mcg/actuation inhaler     Sig: Inhale 1 puff 4 times a day.     Dispense:  12 g     Refill:  1    lansoprazole (Prevacid) 30 mg DR capsule     Sig: Take 1 capsule (30 mg) by mouth once daily.     Dispense:  90 capsule     Refill:  1    amitriptyline (Elavil) 50 mg tablet     Sig: Take 1 tablet (50 mg) by mouth once daily at bedtime.     Dispense:  90 tablet     Refill:  1    oxyBUTYnin XL (Ditropan-XL) 15 mg 24 hr tablet     Sig: Take 1 tablet (15 mg) by mouth once daily.     Dispense:  90 tablet     Refill:  1    gabapentin (Neurontin) 300 mg capsule     Sig: Take 1 capsule (300 mg) by mouth 3 times a day.     Dispense:  270 capsule     Refill:  1

## 2025-06-13 NOTE — ASSESSMENT & PLAN NOTE
Condition well controlled.  No change in current treatment regimen.  Refill given of current medication.  Make a follow up appointment with me for recheck in 6 months.  Orders:    lansoprazole (Prevacid) 30 mg DR capsule; Take 1 capsule (30 mg) by mouth once daily.

## 2025-06-25 LAB
ALBUMIN SERPL-MCNC: 3.8 G/DL (ref 3.6–5.1)
ALP SERPL-CCNC: 117 U/L (ref 37–153)
ALT SERPL-CCNC: 12 U/L (ref 6–29)
ANION GAP SERPL CALCULATED.4IONS-SCNC: 4 MMOL/L (CALC) (ref 7–17)
AST SERPL-CCNC: 18 U/L (ref 10–35)
BILIRUB SERPL-MCNC: 0.4 MG/DL (ref 0.2–1.2)
BUN SERPL-MCNC: 15 MG/DL (ref 7–25)
CALCIUM SERPL-MCNC: 9 MG/DL (ref 8.6–10.4)
CHLORIDE SERPL-SCNC: 109 MMOL/L (ref 98–110)
CHOLEST SERPL-MCNC: 146 MG/DL
CHOLEST/HDLC SERPL: 3.2 (CALC)
CO2 SERPL-SCNC: 22 MMOL/L (ref 20–32)
CREAT SERPL-MCNC: 0.61 MG/DL (ref 0.6–1)
EGFRCR SERPLBLD CKD-EPI 2021: 93 ML/MIN/1.73M2
GLUCOSE SERPL-MCNC: 100 MG/DL (ref 65–99)
HDLC SERPL-MCNC: 45 MG/DL
LDLC SERPL CALC-MCNC: 80 MG/DL (CALC)
NONHDLC SERPL-MCNC: 101 MG/DL (CALC)
POTASSIUM SERPL-SCNC: 4.9 MMOL/L (ref 3.5–5.3)
PROT SERPL-MCNC: 6.3 G/DL (ref 6.1–8.1)
SODIUM SERPL-SCNC: 135 MMOL/L (ref 135–146)
TRIGL SERPL-MCNC: 112 MG/DL

## 2025-07-31 DIAGNOSIS — D64.9 ANEMIA, UNSPECIFIED TYPE: ICD-10-CM

## 2025-07-31 RX ORDER — FERROUS SULFATE 325(65) MG
TABLET, DELAYED RELEASE (ENTERIC COATED) ORAL
Qty: 90 TABLET | Refills: 1 | Status: SHIPPED | OUTPATIENT
Start: 2025-07-31

## 2025-12-11 ENCOUNTER — APPOINTMENT (OUTPATIENT)
Dept: PRIMARY CARE | Facility: CLINIC | Age: 75
End: 2025-12-11
Payer: MEDICARE